# Patient Record
Sex: MALE | Race: WHITE | URBAN - METROPOLITAN AREA
[De-identification: names, ages, dates, MRNs, and addresses within clinical notes are randomized per-mention and may not be internally consistent; named-entity substitution may affect disease eponyms.]

---

## 2019-09-25 LAB
ANION GAP SERPL CALCULATED.3IONS-SCNC: 14 MMOL/L (ref 10–20)
BICARBONATE: 30 MMOL/L (ref 21–32)
CALCIUM SERPL-MCNC: 8.6 MG/DL (ref 8.6–10.3)
CHLORIDE BLD-SCNC: 97 MMOL/L (ref 98–107)
CREAT SERPL-MCNC: 1.62 MG/DL (ref 0.5–1.3)
ERYTHROCYTE [DISTWIDTH] IN BLOOD BY AUTOMATED COUNT: 12.5 % (ref 11.5–14)
GFR AFRICAN AMERICAN: 51 ML/MIN/1.73M2
GFR NON-AFRICAN AMERICAN: 42 ML/MIN/1.73M2
GLUCOSE: 247 MG/DL (ref 74–99)
HCT VFR BLD CALC: 35.8 % (ref 41–52)
HEMOGLOBIN: 11.7 G/DL (ref 13.5–17)
MCHC RBC AUTO-ENTMCNC: 32.7 G/DL (ref 32–36)
MCV RBC AUTO: 89 FL (ref 80–100)
PLATELET # BLD: 357 X10E9/L (ref 150–450)
POTASSIUM SERPL-SCNC: 3.7 MMOL/L (ref 3.5–5.3)
RBC # BLD: 4.03 X10E12/L (ref 4.5–5.9)
SODIUM BLD-SCNC: 137 MMOL/L (ref 136–145)
UREA NITROGEN: 25 MG/DL (ref 6–23)
WBC: 6.5 X10E9/L (ref 4.4–11.3)

## 2019-10-02 LAB
ANION GAP SERPL CALCULATED.3IONS-SCNC: 13 MMOL/L (ref 10–20)
BICARBONATE: 29 MMOL/L (ref 21–32)
CALCIUM SERPL-MCNC: 9.5 MG/DL (ref 8.6–10.3)
CHLORIDE BLD-SCNC: 101 MMOL/L (ref 98–107)
CREAT SERPL-MCNC: 1.41 MG/DL (ref 0.5–1.3)
ERYTHROCYTE [DISTWIDTH] IN BLOOD BY AUTOMATED COUNT: 12.7 % (ref 11.5–14)
GFR AFRICAN AMERICAN: 59 ML/MIN/1.73M2
GFR NON-AFRICAN AMERICAN: 49 ML/MIN/1.73M2
GLUCOSE: 66 MG/DL (ref 74–99)
HCT VFR BLD CALC: 35.6 % (ref 41–52)
HEMOGLOBIN: 11.6 G/DL (ref 13.5–17)
MCHC RBC AUTO-ENTMCNC: 32.6 G/DL (ref 32–36)
MCV RBC AUTO: 88 FL (ref 80–100)
PLATELET # BLD: 536 X10E9/L (ref 150–450)
POTASSIUM SERPL-SCNC: 3.8 MMOL/L (ref 3.5–5.3)
RBC # BLD: 4.05 X10E12/L (ref 4.5–5.9)
SODIUM BLD-SCNC: 139 MMOL/L (ref 136–145)
UREA NITROGEN: 27 MG/DL (ref 6–23)
WBC: 9 X10E9/L (ref 4.4–11.3)

## 2019-10-03 ENCOUNTER — TELEPHONE (OUTPATIENT)
Dept: INFECTIOUS DISEASES | Age: 74
End: 2019-10-03

## 2019-10-07 ENCOUNTER — TELEPHONE (OUTPATIENT)
Dept: INFECTIOUS DISEASES | Age: 74
End: 2019-10-07

## 2019-10-08 ENCOUNTER — OFFICE VISIT (OUTPATIENT)
Dept: INFECTIOUS DISEASES | Age: 74
End: 2019-10-08
Payer: MEDICARE

## 2019-10-08 VITALS
BODY MASS INDEX: 34.4 KG/M2 | HEART RATE: 62 BPM | TEMPERATURE: 97.8 F | HEIGHT: 72 IN | WEIGHT: 254 LBS | DIASTOLIC BLOOD PRESSURE: 64 MMHG | RESPIRATION RATE: 17 BRPM | SYSTOLIC BLOOD PRESSURE: 160 MMHG

## 2019-10-08 DIAGNOSIS — L03.115 CELLULITIS OF RIGHT LOWER EXTREMITY: Primary | ICD-10-CM

## 2019-10-08 PROCEDURE — 99213 OFFICE O/P EST LOW 20 MIN: CPT | Performed by: INTERNAL MEDICINE

## 2019-10-08 RX ORDER — AMOXICILLIN 250 MG/1
500 CAPSULE ORAL 3 TIMES DAILY
Qty: 60 CAPSULE | Refills: 1 | Status: SHIPPED | OUTPATIENT
Start: 2019-10-08 | End: 2019-10-18

## 2019-10-08 ASSESSMENT — ENCOUNTER SYMPTOMS
RESPIRATORY NEGATIVE: 1
GASTROINTESTINAL NEGATIVE: 1

## 2019-11-22 ENCOUNTER — TELEPHONE (OUTPATIENT)
Dept: INFECTIOUS DISEASES | Age: 74
End: 2019-11-22

## 2019-11-22 RX ORDER — AMOXICILLIN 500 MG/1
500 CAPSULE ORAL 4 TIMES DAILY
Qty: 40 CAPSULE | Refills: 0 | Status: SHIPPED | OUTPATIENT
Start: 2019-11-22 | End: 2019-12-02

## 2019-11-25 ENCOUNTER — TELEPHONE (OUTPATIENT)
Dept: INFECTIOUS DISEASES | Age: 74
End: 2019-11-25

## 2020-04-09 ENCOUNTER — VIRTUAL VISIT (OUTPATIENT)
Dept: INFECTIOUS DISEASES | Age: 75
End: 2020-04-09
Payer: MEDICARE

## 2020-04-09 PROCEDURE — 99214 OFFICE O/P EST MOD 30 MIN: CPT | Performed by: INTERNAL MEDICINE

## 2020-04-09 RX ORDER — AMOXICILLIN 500 MG/1
500 CAPSULE ORAL 2 TIMES DAILY
Qty: 20 CAPSULE | Refills: 0 | Status: SHIPPED | OUTPATIENT
Start: 2020-04-09 | End: 2021-08-31 | Stop reason: SDUPTHER

## 2020-04-09 NOTE — PROGRESS NOTES
infection were also discussed as well as what it means to self-quarantine in the event of mild symptoms.      Time spent with patient: 25 min  >50% of time spent counseling on patient care and coordination of care      Caitlyn Baker DO

## 2021-08-30 NOTE — TELEPHONE ENCOUNTER
Patient states he is experiencing inflamation to the RLE, as before, concerned the Cellulitis has returned. RLE Red and Warm. Pain is about a 3-4(on scale from 1-10) Denies Fever/chills, no blisters or sores. Patient confirms local Phx is [facility] in [place], [place]. Patient last seen 4-9-2020 by Dr Carina Riley. Amoxil 500 mg, po, bid, x14 days was ordered at that time. Pt confirms NKA.    Will forward request.

## 2021-08-31 RX ORDER — AMOXICILLIN 500 MG/1
500 CAPSULE ORAL 2 TIMES DAILY
Qty: 20 CAPSULE | Refills: 0 | Status: SHIPPED | OUTPATIENT
Start: 2021-08-31 | End: 2021-09-14

## 2021-09-01 NOTE — TELEPHONE ENCOUNTER
Return call to patient, to advise of Rx having been approved and sent to local pharmacy. ID physician states to have him F/u in clinic. Patient was not home at this time, his wife took the message and will have him return a call to ID. Also, [facility] called to Confirm the order. It states to take for 14 days, bid, but only a quantity of 20. Advised to provide a Quantity of 28.

## 2023-01-01 ENCOUNTER — NURSING HOME VISIT (OUTPATIENT)
Dept: POST ACUTE CARE | Facility: EXTERNAL LOCATION | Age: 78
End: 2023-01-01
Payer: MEDICARE

## 2023-01-01 ENCOUNTER — HOME VISIT (OUTPATIENT)
Dept: PRIMARY CARE | Facility: CLINIC | Age: 78
End: 2023-01-01
Payer: MEDICARE

## 2023-01-01 ENCOUNTER — APPOINTMENT (OUTPATIENT)
Dept: PRIMARY CARE | Facility: CLINIC | Age: 78
End: 2023-01-01
Payer: MEDICARE

## 2023-01-01 ENCOUNTER — PATIENT OUTREACH (OUTPATIENT)
Dept: PHARMACY | Facility: HOSPITAL | Age: 78
End: 2023-01-01
Payer: MEDICARE

## 2023-01-01 ENCOUNTER — PATIENT OUTREACH (OUTPATIENT)
Dept: CARE COORDINATION | Facility: CLINIC | Age: 78
End: 2023-01-01
Payer: MEDICARE

## 2023-01-01 ENCOUNTER — APPOINTMENT (OUTPATIENT)
Dept: VASCULAR SURGERY | Facility: CLINIC | Age: 78
End: 2023-01-01
Payer: MEDICARE

## 2023-01-01 ENCOUNTER — TELEPHONE (OUTPATIENT)
Dept: PRIMARY CARE | Facility: CLINIC | Age: 78
End: 2023-01-01
Payer: MEDICARE

## 2023-01-01 ENCOUNTER — OFFICE VISIT (OUTPATIENT)
Dept: PRIMARY CARE | Facility: CLINIC | Age: 78
End: 2023-01-01
Payer: MEDICARE

## 2023-01-01 VITALS
HEART RATE: 56 BPM | BODY MASS INDEX: 35.16 KG/M2 | TEMPERATURE: 96.9 F | SYSTOLIC BLOOD PRESSURE: 140 MMHG | DIASTOLIC BLOOD PRESSURE: 79 MMHG | HEIGHT: 74 IN | WEIGHT: 274 LBS

## 2023-01-01 VITALS
DIASTOLIC BLOOD PRESSURE: 56 MMHG | SYSTOLIC BLOOD PRESSURE: 80 MMHG | WEIGHT: 213 LBS | HEIGHT: 68 IN | BODY MASS INDEX: 32.28 KG/M2 | HEART RATE: 78 BPM | TEMPERATURE: 96.9 F

## 2023-01-01 VITALS
DIASTOLIC BLOOD PRESSURE: 80 MMHG | TEMPERATURE: 98 F | BODY MASS INDEX: 32.71 KG/M2 | HEART RATE: 77 BPM | WEIGHT: 212 LBS | SYSTOLIC BLOOD PRESSURE: 151 MMHG

## 2023-01-01 DIAGNOSIS — I10 BENIGN ESSENTIAL HYPERTENSION: ICD-10-CM

## 2023-01-01 DIAGNOSIS — E11.22 TYPE 2 DIABETES MELLITUS WITH ESRD (END-STAGE RENAL DISEASE) (MULTI): ICD-10-CM

## 2023-01-01 DIAGNOSIS — N18.30 STAGE 3 CHRONIC KIDNEY DISEASE, UNSPECIFIED WHETHER STAGE 3A OR 3B CKD (MULTI): ICD-10-CM

## 2023-01-01 DIAGNOSIS — Z79.4 TYPE 2 DIABETES MELLITUS WITH STAGE 4 CHRONIC KIDNEY DISEASE, WITH LONG-TERM CURRENT USE OF INSULIN (MULTI): Primary | ICD-10-CM

## 2023-01-01 DIAGNOSIS — N18.4 TYPE 2 DIABETES MELLITUS WITH STAGE 4 CHRONIC KIDNEY DISEASE, WITH LONG-TERM CURRENT USE OF INSULIN (MULTI): Primary | ICD-10-CM

## 2023-01-01 DIAGNOSIS — I13.10 CARDIORENAL DISEASE: ICD-10-CM

## 2023-01-01 DIAGNOSIS — E66.01 OBESITY, MORBID (MULTI): ICD-10-CM

## 2023-01-01 DIAGNOSIS — I50.43 ACUTE ON CHRONIC COMBINED SYSTOLIC AND DIASTOLIC CONGESTIVE HEART FAILURE (MULTI): ICD-10-CM

## 2023-01-01 DIAGNOSIS — E11.9 DIABETES MELLITUS WITHOUT COMPLICATION (MULTI): ICD-10-CM

## 2023-01-01 DIAGNOSIS — N18.4 TYPE 2 DIABETES MELLITUS WITH STAGE 4 CHRONIC KIDNEY DISEASE, WITH LONG-TERM CURRENT USE OF INSULIN (MULTI): ICD-10-CM

## 2023-01-01 DIAGNOSIS — I48.0 PAROXYSMAL ATRIAL FIBRILLATION (MULTI): Primary | ICD-10-CM

## 2023-01-01 DIAGNOSIS — G47.00 INSOMNIA, UNSPECIFIED TYPE: ICD-10-CM

## 2023-01-01 DIAGNOSIS — I50.9 CHRONIC HEART FAILURE, UNSPECIFIED HEART FAILURE TYPE (MULTI): ICD-10-CM

## 2023-01-01 DIAGNOSIS — E78.00 HYPERCHOLESTEROLEMIA: ICD-10-CM

## 2023-01-01 DIAGNOSIS — Z79.4 TYPE 2 DIABETES MELLITUS WITH STAGE 4 CHRONIC KIDNEY DISEASE, WITH LONG-TERM CURRENT USE OF INSULIN (MULTI): ICD-10-CM

## 2023-01-01 DIAGNOSIS — R06.02 SOB (SHORTNESS OF BREATH) ON EXERTION: Primary | ICD-10-CM

## 2023-01-01 DIAGNOSIS — E11.22 TYPE 2 DIABETES MELLITUS WITH STAGE 4 CHRONIC KIDNEY DISEASE, WITH LONG-TERM CURRENT USE OF INSULIN (MULTI): Primary | ICD-10-CM

## 2023-01-01 DIAGNOSIS — M54.50 CHRONIC LOW BACK PAIN, UNSPECIFIED BACK PAIN LATERALITY, UNSPECIFIED WHETHER SCIATICA PRESENT: ICD-10-CM

## 2023-01-01 DIAGNOSIS — F51.01 PRIMARY INSOMNIA: Primary | ICD-10-CM

## 2023-01-01 DIAGNOSIS — N18.4 CHRONIC KIDNEY DISEASE, STAGE 4 (SEVERE) (MULTI): ICD-10-CM

## 2023-01-01 DIAGNOSIS — E11.22 TYPE 2 DIABETES MELLITUS WITH STAGE 4 CHRONIC KIDNEY DISEASE, WITH LONG-TERM CURRENT USE OF INSULIN (MULTI): ICD-10-CM

## 2023-01-01 DIAGNOSIS — E87.70 HYPERVOLEMIA, UNSPECIFIED HYPERVOLEMIA TYPE: ICD-10-CM

## 2023-01-01 DIAGNOSIS — R35.1 NOCTURIA: ICD-10-CM

## 2023-01-01 DIAGNOSIS — I48.11 LONGSTANDING PERSISTENT ATRIAL FIBRILLATION (MULTI): ICD-10-CM

## 2023-01-01 DIAGNOSIS — J81.0 ACUTE PULMONARY EDEMA (MULTI): ICD-10-CM

## 2023-01-01 DIAGNOSIS — G89.29 CHRONIC LOW BACK PAIN, UNSPECIFIED BACK PAIN LATERALITY, UNSPECIFIED WHETHER SCIATICA PRESENT: ICD-10-CM

## 2023-01-01 DIAGNOSIS — I48.0 PAROXYSMAL ATRIAL FIBRILLATION (MULTI): ICD-10-CM

## 2023-01-01 DIAGNOSIS — I50.33 ACUTE ON CHRONIC DIASTOLIC HEART FAILURE (MULTI): ICD-10-CM

## 2023-01-01 DIAGNOSIS — Z95.0 PRESENCE OF CARDIAC PACEMAKER: ICD-10-CM

## 2023-01-01 DIAGNOSIS — I10 BENIGN HYPERTENSION: ICD-10-CM

## 2023-01-01 DIAGNOSIS — E11.9 TYPE 2 DIABETES MELLITUS WITHOUT COMPLICATION, WITHOUT LONG-TERM CURRENT USE OF INSULIN (MULTI): ICD-10-CM

## 2023-01-01 DIAGNOSIS — I12.9 HYPERTENSIVE NEPHROPATHY: ICD-10-CM

## 2023-01-01 DIAGNOSIS — N18.6 TYPE 2 DIABETES MELLITUS WITH ESRD (END-STAGE RENAL DISEASE) (MULTI): ICD-10-CM

## 2023-01-01 DIAGNOSIS — I89.0 SECONDARY LYMPHEDEMA: Primary | ICD-10-CM

## 2023-01-01 DIAGNOSIS — N32.81 OAB (OVERACTIVE BLADDER): Primary | ICD-10-CM

## 2023-01-01 DIAGNOSIS — I49.5 SICK SINUS SYNDROME (MULTI): ICD-10-CM

## 2023-01-01 DIAGNOSIS — M54.50 ACUTE BILATERAL LOW BACK PAIN WITHOUT SCIATICA: Primary | ICD-10-CM

## 2023-01-01 LAB
ALANINE AMINOTRANSFERASE (SGPT) (U/L) IN SER/PLAS: 29 U/L (ref 10–52)
ALBUMIN (G/DL) IN SER/PLAS: 3.7 G/DL (ref 3.4–5)
ALKALINE PHOSPHATASE (U/L) IN SER/PLAS: 115 U/L (ref 33–136)
ANION GAP IN SER/PLAS: 13 MMOL/L (ref 10–20)
APPEARANCE, URINE: CLEAR
ASPARTATE AMINOTRANSFERASE (SGOT) (U/L) IN SER/PLAS: 24 U/L (ref 9–39)
BILIRUBIN TOTAL (MG/DL) IN SER/PLAS: 0.5 MG/DL (ref 0–1.2)
BILIRUBIN, URINE: NEGATIVE
BLOOD, URINE: ABNORMAL
CALCIUM (MG/DL) IN SER/PLAS: 9.2 MG/DL (ref 8.6–10.3)
CARBON DIOXIDE, TOTAL (MMOL/L) IN SER/PLAS: 24 MMOL/L (ref 21–32)
CHLORIDE (MMOL/L) IN SER/PLAS: 111 MMOL/L (ref 98–107)
COLOR, URINE: YELLOW
CREATININE (MG/DL) IN SER/PLAS: 2.46 MG/DL (ref 0.5–1.3)
CREATININE (MG/DL) IN URINE: 69.8 MG/DL (ref 20–370)
ERYTHROCYTE DISTRIBUTION WIDTH (RATIO) BY AUTOMATED COUNT: 14.2 % (ref 11.5–14.5)
ERYTHROCYTE MEAN CORPUSCULAR HEMOGLOBIN CONCENTRATION (G/DL) BY AUTOMATED: 31.3 G/DL (ref 32–36)
ERYTHROCYTE MEAN CORPUSCULAR VOLUME (FL) BY AUTOMATED COUNT: 89 FL (ref 80–100)
ERYTHROCYTES (10*6/UL) IN BLOOD BY AUTOMATED COUNT: 3.97 X10E12/L (ref 4.5–5.9)
ESTIMATED AVERAGE GLUCOSE FOR HBA1C: 157 MG/DL
GFR MALE: 26 ML/MIN/1.73M2
GLUCOSE (MG/DL) IN SER/PLAS: 102 MG/DL (ref 74–99)
GLUCOSE, URINE: 100 MG/DL
HEMATOCRIT (%) IN BLOOD BY AUTOMATED COUNT: 35.5 % (ref 41–52)
HEMOGLOBIN (G/DL) IN BLOOD: 11.1 G/DL (ref 13.5–17.5)
HEMOGLOBIN A1C/HEMOGLOBIN TOTAL IN BLOOD: 7.1 %
KETONES, URINE: NEGATIVE MG/DL
LEUKOCYTE ESTERASE, URINE: NEGATIVE
LEUKOCYTES (10*3/UL) IN BLOOD BY AUTOMATED COUNT: 5.6 X10E9/L (ref 4.4–11.3)
NITRITE, URINE: NEGATIVE
PH, URINE: 5.5 (ref 5–8)
PHOSPHATE (MG/DL) IN SER/PLAS: 4.7 MG/DL (ref 2.5–4.9)
PLATELETS (10*3/UL) IN BLOOD AUTOMATED COUNT: 295 X10E9/L (ref 150–450)
POTASSIUM (MMOL/L) IN SER/PLAS: 4.7 MMOL/L (ref 3.5–5.3)
PROTEIN (MG/DL) IN URINE: 605 MG/DL (ref 5–25)
PROTEIN TOTAL: 6.4 G/DL (ref 6.4–8.2)
PROTEIN, URINE: ABNORMAL MG/DL
PROTEIN/CREATININE (MG/MG) IN URINE: 8.67 MG/MG CREAT (ref 0–0.17)
RBC, URINE: 1 /HPF (ref 0–5)
SODIUM (MMOL/L) IN SER/PLAS: 143 MMOL/L (ref 136–145)
SPECIFIC GRAVITY, URINE: 1.02 (ref 1–1.03)
SQUAMOUS EPITHELIAL CELLS, URINE: <1 /HPF
UREA NITROGEN (MG/DL) IN SER/PLAS: 41 MG/DL (ref 6–23)
UROBILINOGEN, URINE: <2 MG/DL (ref 0–1.9)
WBC, URINE: 1 /HPF (ref 0–5)

## 2023-01-01 PROCEDURE — 1036F TOBACCO NON-USER: CPT | Performed by: INTERNAL MEDICINE

## 2023-01-01 PROCEDURE — 1160F RVW MEDS BY RX/DR IN RCRD: CPT | Performed by: INTERNAL MEDICINE

## 2023-01-01 PROCEDURE — 99308 SBSQ NF CARE LOW MDM 20: CPT | Performed by: STUDENT IN AN ORGANIZED HEALTH CARE EDUCATION/TRAINING PROGRAM

## 2023-01-01 PROCEDURE — 99306 1ST NF CARE HIGH MDM 50: CPT | Performed by: STUDENT IN AN ORGANIZED HEALTH CARE EDUCATION/TRAINING PROGRAM

## 2023-01-01 PROCEDURE — 1159F MED LIST DOCD IN RCRD: CPT | Performed by: INTERNAL MEDICINE

## 2023-01-01 PROCEDURE — 99309 SBSQ NF CARE MODERATE MDM 30: CPT | Performed by: STUDENT IN AN ORGANIZED HEALTH CARE EDUCATION/TRAINING PROGRAM

## 2023-01-01 PROCEDURE — 3078F DIAST BP <80 MM HG: CPT | Performed by: INTERNAL MEDICINE

## 2023-01-01 PROCEDURE — 3077F SYST BP >= 140 MM HG: CPT | Performed by: INTERNAL MEDICINE

## 2023-01-01 PROCEDURE — 99305 1ST NF CARE MODERATE MDM 35: CPT | Performed by: STUDENT IN AN ORGANIZED HEALTH CARE EDUCATION/TRAINING PROGRAM

## 2023-01-01 PROCEDURE — 1157F ADVNC CARE PLAN IN RCRD: CPT | Performed by: INTERNAL MEDICINE

## 2023-01-01 PROCEDURE — 99495 TRANSJ CARE MGMT MOD F2F 14D: CPT | Performed by: INTERNAL MEDICINE

## 2023-01-01 PROCEDURE — 3079F DIAST BP 80-89 MM HG: CPT | Performed by: INTERNAL MEDICINE

## 2023-01-01 PROCEDURE — 99213 OFFICE O/P EST LOW 20 MIN: CPT | Performed by: INTERNAL MEDICINE

## 2023-01-01 PROCEDURE — 3074F SYST BP LT 130 MM HG: CPT | Performed by: INTERNAL MEDICINE

## 2023-01-01 RX ORDER — HYDRALAZINE HYDROCHLORIDE 25 MG/1
TABLET, FILM COATED ORAL
COMMUNITY

## 2023-01-01 RX ORDER — LOSARTAN POTASSIUM 50 MG/1
50 TABLET ORAL DAILY
Qty: 90 TABLET | Refills: 3 | Status: SHIPPED | OUTPATIENT
Start: 2023-01-01 | End: 2023-10-13 | Stop reason: CLARIF

## 2023-01-01 RX ORDER — BENZONATATE 200 MG/1
2 CAPSULE ORAL 2 TIMES DAILY
COMMUNITY
Start: 2023-01-01

## 2023-01-01 RX ORDER — HYDRALAZINE HYDROCHLORIDE 100 MG/1
100 TABLET, FILM COATED ORAL EVERY 8 HOURS
COMMUNITY
Start: 2023-01-01

## 2023-01-01 RX ORDER — PEN NEEDLE, DIABETIC 31 GX3/16"
2 NEEDLE, DISPOSABLE MISCELLANEOUS 2 TIMES DAILY
Qty: 200 EACH | Refills: 3 | Status: SHIPPED
Start: 2023-01-01 | End: 2023-10-13 | Stop reason: CLARIF

## 2023-01-01 RX ORDER — AMLODIPINE BESYLATE 10 MG/1
10 TABLET ORAL DAILY
Qty: 90 TABLET | Refills: 0 | Status: SHIPPED | OUTPATIENT
Start: 2023-01-01 | End: 2023-01-01 | Stop reason: DRUGHIGH

## 2023-01-01 RX ORDER — OXYBUTYNIN CHLORIDE 5 MG/1
5 TABLET ORAL 3 TIMES DAILY
COMMUNITY
End: 2023-01-01 | Stop reason: SDUPTHER

## 2023-01-01 RX ORDER — METOPROLOL SUCCINATE 25 MG/1
1 TABLET, EXTENDED RELEASE ORAL DAILY
COMMUNITY
Start: 2023-01-01 | End: 2023-01-01 | Stop reason: ENTERED-IN-ERROR

## 2023-01-01 RX ORDER — ATORVASTATIN CALCIUM 20 MG/1
20 TABLET, FILM COATED ORAL NIGHTLY
Qty: 90 TABLET | Refills: 3 | Status: SHIPPED | OUTPATIENT
Start: 2023-01-01 | End: 2023-10-13 | Stop reason: CLARIF

## 2023-01-01 RX ORDER — VALSARTAN AND HYDROCHLOROTHIAZIDE 320; 25 MG/1; MG/1
TABLET, FILM COATED ORAL
COMMUNITY

## 2023-01-01 RX ORDER — ERGOCALCIFEROL 1.25 MG/1
CAPSULE ORAL
COMMUNITY
End: 2023-01-01 | Stop reason: CLARIF

## 2023-01-01 RX ORDER — GABAPENTIN 300 MG/1
300 CAPSULE ORAL 2 TIMES DAILY
Qty: 180 CAPSULE | Refills: 0 | Status: SHIPPED | OUTPATIENT
Start: 2023-01-01 | End: 2023-01-01 | Stop reason: SDUPTHER

## 2023-01-01 RX ORDER — TRAMADOL HYDROCHLORIDE 50 MG/1
1 TABLET ORAL EVERY 6 HOURS
COMMUNITY
Start: 2023-01-01

## 2023-01-01 RX ORDER — IBUPROFEN 200 MG
1 CAPSULE ORAL 2 TIMES DAILY
Qty: 100 STRIP | Refills: 3 | Status: SHIPPED | OUTPATIENT
Start: 2023-01-01 | End: 2023-10-13 | Stop reason: CLARIF

## 2023-01-01 RX ORDER — BISACODYL 10 MG/1
1 SUPPOSITORY RECTAL DAILY PRN
COMMUNITY

## 2023-01-01 RX ORDER — GUAIFENESIN 400 MG/1
1 TABLET ORAL 4 TIMES DAILY
COMMUNITY
Start: 2023-01-01

## 2023-01-01 RX ORDER — METOPROLOL TARTRATE 25 MG/1
TABLET, FILM COATED ORAL
COMMUNITY
Start: 2022-08-30 | End: 2023-01-01 | Stop reason: DRUGHIGH

## 2023-01-01 RX ORDER — CLONIDINE 0.1 MG/24H
0.1 PATCH, EXTENDED RELEASE TRANSDERMAL DAILY
COMMUNITY

## 2023-01-01 RX ORDER — AMLODIPINE BESYLATE 10 MG/1
0.5 TABLET ORAL DAILY
COMMUNITY
End: 2023-01-01 | Stop reason: ALTCHOICE

## 2023-01-01 RX ORDER — ASPIRIN 81 MG/1
1 TABLET ORAL DAILY
COMMUNITY
Start: 2017-10-12

## 2023-01-01 RX ORDER — LANCETS
EACH MISCELLANEOUS
Qty: 100 EACH | Refills: 3 | Status: SHIPPED | OUTPATIENT
Start: 2023-01-01 | End: 2023-01-01 | Stop reason: SDUPTHER

## 2023-01-01 RX ORDER — DILTIAZEM HYDROCHLORIDE 240 MG/1
CAPSULE, EXTENDED RELEASE ORAL
COMMUNITY
Start: 2022-07-19 | End: 2023-01-01 | Stop reason: ALTCHOICE

## 2023-01-01 RX ORDER — ACETAMINOPHEN 325 MG/1
2 TABLET ORAL EVERY 4 HOURS PRN
COMMUNITY

## 2023-01-01 RX ORDER — AMLODIPINE BESYLATE 10 MG/1
10 TABLET ORAL DAILY
Qty: 30 TABLET | Refills: 0 | Status: SHIPPED | OUTPATIENT
Start: 2023-01-01 | End: 2023-01-01 | Stop reason: SDUPTHER

## 2023-01-01 RX ORDER — ASPIRIN 325 MG
1 TABLET, DELAYED RELEASE (ENTERIC COATED) ORAL
COMMUNITY

## 2023-01-01 RX ORDER — FERROUS GLUCONATE 325 MG
38 TABLET ORAL 2 TIMES DAILY
COMMUNITY

## 2023-01-01 RX ORDER — CYCLOBENZAPRINE HCL 10 MG
10 TABLET ORAL 3 TIMES DAILY PRN
COMMUNITY
End: 2023-01-01 | Stop reason: ALTCHOICE

## 2023-01-01 RX ORDER — INSULIN LISPRO 100 [IU]/ML
INJECTION, SOLUTION INTRAVENOUS; SUBCUTANEOUS
COMMUNITY

## 2023-01-01 RX ORDER — SPIRONOLACTONE AND HYDROCHLOROTHIAZIDE 25; 25 MG/1; MG/1
1 TABLET ORAL DAILY
COMMUNITY

## 2023-01-01 RX ORDER — ALUMINUM HYDROXIDE, MAGNESIUM HYDROXIDE, AND SIMETHICONE 1200; 120; 1200 MG/30ML; MG/30ML; MG/30ML
15 SUSPENSION ORAL EVERY 6 HOURS PRN
COMMUNITY

## 2023-01-01 RX ORDER — GABAPENTIN 300 MG/1
300 CAPSULE ORAL 2 TIMES DAILY
COMMUNITY
Start: 2022-01-01 | End: 2023-01-01 | Stop reason: SDUPTHER

## 2023-01-01 RX ORDER — TORSEMIDE 20 MG/1
20 TABLET ORAL
COMMUNITY
Start: 2023-01-01

## 2023-01-01 RX ORDER — AMIODARONE HYDROCHLORIDE 200 MG/1
200 TABLET ORAL DAILY
COMMUNITY
Start: 2023-01-01

## 2023-01-01 RX ORDER — CYCLOBENZAPRINE HCL 10 MG
10 TABLET ORAL NIGHTLY PRN
Qty: 15 TABLET | Refills: 0 | Status: SHIPPED
Start: 2023-01-01 | End: 2023-01-01 | Stop reason: DRUGHIGH

## 2023-01-01 RX ORDER — AMOXICILLIN 250 MG
2 CAPSULE ORAL DAILY PRN
COMMUNITY

## 2023-01-01 RX ORDER — AMLODIPINE BESYLATE 10 MG/1
10 TABLET ORAL 2 TIMES DAILY
COMMUNITY
Start: 2017-10-12 | End: 2023-01-01 | Stop reason: SDUPTHER

## 2023-01-01 RX ORDER — OXYBUTYNIN CHLORIDE 5 MG/1
1 TABLET ORAL 3 TIMES DAILY
COMMUNITY

## 2023-01-01 RX ORDER — OXYBUTYNIN CHLORIDE 5 MG/1
5 TABLET ORAL 3 TIMES DAILY
Qty: 90 TABLET | Refills: 3 | Status: SHIPPED | OUTPATIENT
Start: 2023-01-01 | End: 2023-01-01 | Stop reason: SDUPTHER

## 2023-01-01 RX ORDER — IBUPROFEN 200 MG
1 CAPSULE ORAL 2 TIMES DAILY
Qty: 100 STRIP | Refills: 3 | Status: SHIPPED | OUTPATIENT
Start: 2023-01-01 | End: 2023-01-01 | Stop reason: SDUPTHER

## 2023-01-01 RX ORDER — GABAPENTIN 300 MG/1
300 CAPSULE ORAL 2 TIMES DAILY
Qty: 180 CAPSULE | Refills: 0 | Status: SHIPPED | OUTPATIENT
Start: 2023-01-01 | End: 2023-10-13 | Stop reason: CLARIF

## 2023-01-01 RX ORDER — PEN NEEDLE, DIABETIC 31 GX3/16"
2 NEEDLE, DISPOSABLE MISCELLANEOUS 2 TIMES DAILY
COMMUNITY
Start: 2023-01-01 | End: 2023-01-01 | Stop reason: SDUPTHER

## 2023-01-01 RX ORDER — METFORMIN HYDROCHLORIDE 500 MG/1
TABLET ORAL
COMMUNITY
End: 2023-01-01 | Stop reason: ENTERED-IN-ERROR

## 2023-01-01 RX ORDER — AMLODIPINE BESYLATE 2.5 MG/1
TABLET ORAL
COMMUNITY

## 2023-01-01 RX ORDER — DEXTROSE 4 G
TABLET,CHEWABLE ORAL
Qty: 1 EACH | Refills: 0 | Status: SHIPPED | OUTPATIENT
Start: 2023-01-01 | End: 2023-01-01 | Stop reason: SDUPTHER

## 2023-01-01 RX ORDER — INSULIN LISPRO 100 [IU]/ML
INJECTION, SOLUTION INTRAVENOUS; SUBCUTANEOUS
Qty: 10 ML | Refills: 2 | Status: SHIPPED
Start: 2023-01-01 | End: 2023-01-01 | Stop reason: SDUPTHER

## 2023-01-01 RX ORDER — INSULIN GLARGINE 100 [IU]/ML
40 INJECTION, SOLUTION SUBCUTANEOUS DAILY
COMMUNITY
Start: 2023-01-01

## 2023-01-01 RX ORDER — METFORMIN HYDROCHLORIDE EXTENDED-RELEASE TABLETS 500 MG/1
TABLET, FILM COATED, EXTENDED RELEASE ORAL
COMMUNITY

## 2023-01-01 RX ORDER — OXYBUTYNIN CHLORIDE 5 MG/1
5 TABLET, EXTENDED RELEASE ORAL DAILY
Qty: 30 TABLET | Refills: 6 | Status: SHIPPED | OUTPATIENT
Start: 2023-01-01 | End: 2023-10-13 | Stop reason: CLARIF

## 2023-01-01 RX ORDER — TRAZODONE HYDROCHLORIDE 100 MG/1
TABLET ORAL
Qty: 30 TABLET | Refills: 5 | Status: SHIPPED
Start: 2023-01-01 | End: 2023-10-13 | Stop reason: CLARIF

## 2023-01-01 RX ORDER — ADHESIVE BANDAGE
30 BANDAGE TOPICAL NIGHTLY PRN
COMMUNITY

## 2023-01-01 RX ORDER — LOSARTAN POTASSIUM 50 MG/1
50 TABLET ORAL DAILY
COMMUNITY
Start: 2023-01-01 | End: 2023-01-01 | Stop reason: SDUPTHER

## 2023-01-01 RX ORDER — TORSEMIDE 100 MG/1
TABLET ORAL
COMMUNITY
Start: 2022-07-19 | End: 2023-01-01 | Stop reason: ALTCHOICE

## 2023-01-01 RX ORDER — CLONIDINE 0.1 MG/24H
PATCH, EXTENDED RELEASE TRANSDERMAL
COMMUNITY
Start: 2023-01-01 | End: 2023-01-01 | Stop reason: ALTCHOICE

## 2023-01-01 RX ORDER — GUAIFENESIN 600 MG/1
1 TABLET, EXTENDED RELEASE ORAL EVERY 12 HOURS
COMMUNITY

## 2023-01-01 RX ORDER — INSULIN LISPRO 100 [IU]/ML
INJECTION, SOLUTION INTRAVENOUS; SUBCUTANEOUS
COMMUNITY
End: 2023-01-01 | Stop reason: SDUPTHER

## 2023-01-01 RX ORDER — OXYBUTYNIN CHLORIDE 5 MG/1
5 TABLET ORAL 3 TIMES DAILY
Qty: 90 TABLET | Refills: 3 | Status: SHIPPED | OUTPATIENT
Start: 2023-01-01 | End: 2023-01-01 | Stop reason: SINTOL

## 2023-01-01 RX ORDER — INSULIN DETEMIR 100 [IU]/ML
INJECTION, SOLUTION SUBCUTANEOUS
COMMUNITY

## 2023-01-01 RX ORDER — POLYETHYLENE GLYCOL 3350 17 G/17G
17 POWDER, FOR SOLUTION ORAL DAILY PRN
COMMUNITY

## 2023-01-01 RX ORDER — BENZONATATE 200 MG/1
1 CAPSULE ORAL 2 TIMES DAILY
COMMUNITY
Start: 2023-01-01 | End: 2023-01-01 | Stop reason: ENTERED-IN-ERROR

## 2023-01-01 RX ORDER — CLONIDINE HYDROCHLORIDE 0.1 MG/1
1 TABLET ORAL 2 TIMES DAILY
COMMUNITY
Start: 2023-01-01

## 2023-01-01 RX ORDER — LANCETS
EACH MISCELLANEOUS
Qty: 100 EACH | Refills: 3 | Status: SHIPPED | OUTPATIENT
Start: 2023-01-01 | End: 2023-10-13 | Stop reason: CLARIF

## 2023-01-01 RX ORDER — TRAZODONE HYDROCHLORIDE 100 MG/1
TABLET ORAL
COMMUNITY
Start: 2017-10-12 | End: 2023-01-01

## 2023-01-01 RX ORDER — INSULIN GLARGINE 300 U/ML
40 INJECTION, SOLUTION SUBCUTANEOUS DAILY
Qty: 4.5 ML | Refills: 9 | Status: SHIPPED
Start: 2023-01-01 | End: 2023-01-01 | Stop reason: DRUGHIGH

## 2023-01-01 RX ORDER — HYDROCODONE/ACETAMINOPHEN 5 MG-500MG
6 TABLET ORAL
COMMUNITY

## 2023-01-01 RX ORDER — INSULIN LISPRO 100 [IU]/ML
INJECTION, SOLUTION INTRAVENOUS; SUBCUTANEOUS
COMMUNITY
Start: 2023-01-01

## 2023-01-01 RX ORDER — CLONIDINE HYDROCHLORIDE 0.1 MG/1
1 TABLET ORAL DAILY
COMMUNITY
End: 2023-01-01 | Stop reason: ALTCHOICE

## 2023-01-01 RX ORDER — AMLODIPINE BESYLATE 5 MG/1
5 TABLET ORAL DAILY
Qty: 90 TABLET | Refills: 1 | Status: SHIPPED | OUTPATIENT
Start: 2023-01-01 | End: 2023-10-13 | Stop reason: CLARIF

## 2023-01-01 RX ORDER — INSULIN GLARGINE 300 U/ML
50 INJECTION, SOLUTION SUBCUTANEOUS DAILY
Qty: 4.5 ML | Refills: 9 | Status: SHIPPED
Start: 2023-01-01 | End: 2023-10-13 | Stop reason: CLARIF

## 2023-01-01 RX ORDER — INSULIN GLARGINE 300 U/ML
40 INJECTION, SOLUTION SUBCUTANEOUS NIGHTLY
COMMUNITY

## 2023-01-01 RX ORDER — DEXTROSE 4 G
TABLET,CHEWABLE ORAL
Qty: 1 EACH | Refills: 0 | Status: SHIPPED | OUTPATIENT
Start: 2023-01-01 | End: 2023-10-13 | Stop reason: CLARIF

## 2023-01-01 RX ORDER — TAMSULOSIN HYDROCHLORIDE 0.4 MG/1
0.4 CAPSULE ORAL NIGHTLY
COMMUNITY

## 2023-01-01 RX ORDER — LOSARTAN POTASSIUM 25 MG/1
1 TABLET ORAL 2 TIMES DAILY
COMMUNITY

## 2023-01-01 RX ORDER — INSULIN LISPRO 100 [IU]/ML
INJECTION, SOLUTION INTRAVENOUS; SUBCUTANEOUS
Qty: 15 ML | Refills: 3 | Status: SHIPPED | OUTPATIENT
Start: 2023-01-01 | End: 2023-10-13 | Stop reason: CLARIF

## 2023-01-01 RX ORDER — INSULIN GLARGINE 300 U/ML
40 INJECTION, SOLUTION SUBCUTANEOUS DAILY
Qty: 4.5 ML | Refills: 9 | Status: SHIPPED | OUTPATIENT
Start: 2023-01-01 | End: 2023-01-01 | Stop reason: SDUPTHER

## 2023-01-01 RX ORDER — CHOLECALCIFEROL (VITAMIN D3) 1250 MCG
50000 TABLET ORAL
COMMUNITY

## 2023-01-01 RX ORDER — ATORVASTATIN CALCIUM 20 MG/1
1 TABLET, FILM COATED ORAL NIGHTLY
COMMUNITY
Start: 2017-10-12 | End: 2023-01-01 | Stop reason: SDUPTHER

## 2023-01-01 RX ORDER — METOPROLOL SUCCINATE 25 MG/1
25 TABLET, EXTENDED RELEASE ORAL DAILY
Qty: 90 TABLET | Refills: 1 | Status: SHIPPED | OUTPATIENT
Start: 2023-01-01 | End: 2023-10-13 | Stop reason: CLARIF

## 2023-01-01 RX ORDER — SPIRONOLACTONE AND HYDROCHLOROTHIAZIDE 25; 25 MG/1; MG/1
1 TABLET ORAL DAILY
COMMUNITY
Start: 2023-01-01

## 2023-01-01 RX ORDER — INSULIN GLARGINE 300 U/ML
40 INJECTION, SOLUTION SUBCUTANEOUS DAILY
COMMUNITY
Start: 2023-01-01 | End: 2023-01-01 | Stop reason: SDUPTHER

## 2023-01-01 RX ORDER — FUROSEMIDE 40 MG/1
1 TABLET ORAL DAILY
COMMUNITY
Start: 2022-08-09

## 2023-01-01 RX ORDER — IPRATROPIUM BROMIDE AND ALBUTEROL SULFATE 2.5; .5 MG/3ML; MG/3ML
3 SOLUTION RESPIRATORY (INHALATION) 4 TIMES DAILY PRN
COMMUNITY

## 2023-01-01 RX ORDER — LIDOCAINE 50 MG/G
1 PATCH TOPICAL DAILY PRN
COMMUNITY

## 2023-01-01 ASSESSMENT — ENCOUNTER SYMPTOMS
RESPIRATORY NEGATIVE: 1
RESPIRATORY NEGATIVE: 1
PSYCHIATRIC NEGATIVE: 1
GASTROINTESTINAL NEGATIVE: 1
SHORTNESS OF BREATH: 0
CONSTITUTIONAL NEGATIVE: 1
CARDIOVASCULAR NEGATIVE: 1
PSYCHIATRIC NEGATIVE: 1
BLURRED VISION: 0
GASTROINTESTINAL NEGATIVE: 1
HYPERTENSION: 1
CONSTITUTIONAL NEGATIVE: 1
RESPIRATORY NEGATIVE: 1
GASTROINTESTINAL NEGATIVE: 1
NEUROLOGICAL NEGATIVE: 1
SHORTNESS OF BREATH: 1
OCCASIONAL FEELINGS OF UNSTEADINESS: 0
MUSCULOSKELETAL NEGATIVE: 1
NEUROLOGICAL NEGATIVE: 1
RESPIRATORY NEGATIVE: 1
NEUROLOGICAL NEGATIVE: 1
RESPIRATORY NEGATIVE: 1
RESPIRATORY NEGATIVE: 1
CARDIOVASCULAR NEGATIVE: 1
CONFUSION: 0
GASTROINTESTINAL NEGATIVE: 1
CONSTITUTIONAL NEGATIVE: 1
NEUROLOGICAL NEGATIVE: 1
HYPERTENSION: 1
CONSTITUTIONAL NEGATIVE: 1
NEUROLOGICAL NEGATIVE: 1
CONSTITUTIONAL NEGATIVE: 1
WEAKNESS: 0
GASTROINTESTINAL NEGATIVE: 1
DEPRESSION: 0
CHEST TIGHTNESS: 1
GASTROINTESTINAL NEGATIVE: 1
NEUROLOGICAL NEGATIVE: 1
GASTROINTESTINAL NEGATIVE: 1
EYES NEGATIVE: 1
MUSCULOSKELETAL NEGATIVE: 1
MUSCULOSKELETAL NEGATIVE: 1
HEADACHES: 0
PSYCHIATRIC NEGATIVE: 1
PSYCHIATRIC NEGATIVE: 1
NEUROLOGICAL NEGATIVE: 1
CARDIOVASCULAR NEGATIVE: 1
CONSTITUTIONAL NEGATIVE: 1
MUSCULOSKELETAL NEGATIVE: 1
CARDIOVASCULAR NEGATIVE: 1
GASTROINTESTINAL NEGATIVE: 1
GASTROINTESTINAL NEGATIVE: 1
PSYCHIATRIC NEGATIVE: 1
FATIGUE: 1
LOSS OF SENSATION IN FEET: 0
PSYCHIATRIC NEGATIVE: 1
CONSTITUTIONAL NEGATIVE: 1
CONSTITUTIONAL NEGATIVE: 1
WHEEZING: 1
NEUROLOGICAL NEGATIVE: 1
MUSCULOSKELETAL NEGATIVE: 1
GASTROINTESTINAL NEGATIVE: 1
CARDIOVASCULAR NEGATIVE: 1
DIZZINESS: 0
CARDIOVASCULAR NEGATIVE: 1
CARDIOVASCULAR NEGATIVE: 1
MUSCULOSKELETAL NEGATIVE: 1
RESPIRATORY NEGATIVE: 1
MUSCULOSKELETAL NEGATIVE: 1
SHORTNESS OF BREATH: 1
ARTHRALGIAS: 1
MUSCULOSKELETAL NEGATIVE: 1
PSYCHIATRIC NEGATIVE: 1
EYES NEGATIVE: 1
NEUROLOGICAL NEGATIVE: 1
PSYCHIATRIC NEGATIVE: 1
MUSCULOSKELETAL NEGATIVE: 1
CARDIOVASCULAR NEGATIVE: 1
NEUROLOGICAL NEGATIVE: 1
CONSTITUTIONAL NEGATIVE: 1

## 2023-04-24 PROBLEM — Z79.4 TYPE 2 DIABETES MELLITUS WITH STAGE 4 CHRONIC KIDNEY DISEASE, WITH LONG-TERM CURRENT USE OF INSULIN (MULTI): Status: ACTIVE | Noted: 2023-01-01

## 2023-04-24 PROBLEM — J96.01 ACUTE RESPIRATORY FAILURE WITH HYPOXIA (MULTI): Status: ACTIVE | Noted: 2023-01-01

## 2023-04-24 PROBLEM — Z43.3 ENCOUNTER FOR ATTENTION TO COLOSTOMY (MULTI): Status: ACTIVE | Noted: 2023-01-01

## 2023-04-24 PROBLEM — I48.20 CHRONIC ATRIAL FIBRILLATION, UNSPECIFIED (MULTI): Status: ACTIVE | Noted: 2023-01-01

## 2023-04-24 PROBLEM — E78.00 HYPERCHOLESTEROLEMIA: Status: ACTIVE | Noted: 2023-01-01

## 2023-04-24 PROBLEM — N18.4 TYPE 2 DIABETES MELLITUS WITH STAGE 4 CHRONIC KIDNEY DISEASE, WITH LONG-TERM CURRENT USE OF INSULIN (MULTI): Status: ACTIVE | Noted: 2023-01-01

## 2023-04-24 PROBLEM — I10 BENIGN ESSENTIAL HYPERTENSION: Status: ACTIVE | Noted: 2023-01-01

## 2023-04-24 PROBLEM — E11.22 TYPE 2 DIABETES MELLITUS WITH STAGE 4 CHRONIC KIDNEY DISEASE, WITH LONG-TERM CURRENT USE OF INSULIN (MULTI): Status: ACTIVE | Noted: 2023-01-01

## 2023-04-24 PROBLEM — N18.4 CHRONIC KIDNEY DISEASE, STAGE 4 (SEVERE) (MULTI): Status: ACTIVE | Noted: 2023-01-01

## 2023-04-24 PROBLEM — E66.01 OBESITY, MORBID (MULTI): Status: ACTIVE | Noted: 2023-01-01

## 2023-04-24 PROBLEM — I48.0 PAROXYSMAL ATRIAL FIBRILLATION (MULTI): Status: ACTIVE | Noted: 2023-01-01

## 2023-04-24 PROBLEM — I50.9 CHRONIC HEART FAILURE, UNSPECIFIED HEART FAILURE TYPE (MULTI): Status: ACTIVE | Noted: 2023-01-01

## 2023-04-24 PROBLEM — G47.00 INSOMNIA: Status: ACTIVE | Noted: 2023-01-01

## 2023-04-24 NOTE — PROGRESS NOTES
"Subjective   Patient ID: Eliecer Collins is a 78 y.o. male who presents for Follow-up (3 mo fu).    Diabetes  He presents for his follow-up diabetic visit. He has type 2 diabetes mellitus. His disease course has been stable. Pertinent negatives for hypoglycemia include no dizziness, headaches or pallor. Pertinent negatives for diabetes include no chest pain and no weakness. Symptoms are stable. He is compliant with treatment all of the time. He is following a diabetic diet. There is no change in his home blood glucose trend. An ACE inhibitor/angiotensin II receptor blocker is being taken. He does not see a podiatrist.Eye exam is current.   Hypertension  This is a chronic problem. The current episode started more than 1 year ago. The problem has been resolved since onset. Pertinent negatives include no chest pain or headaches. Risk factors for coronary artery disease include dyslipidemia, diabetes mellitus, male gender and obesity. Identifiable causes of hypertension include chronic renal disease.        Review of Systems   Constitutional: Negative.    HENT: Negative.     Eyes: Negative.    Respiratory: Negative.     Cardiovascular:  Positive for leg swelling. Negative for chest pain.   Gastrointestinal: Negative.    Musculoskeletal: Negative.    Skin: Negative.  Negative for pallor.   Neurological: Negative.  Negative for dizziness, weakness and headaches.       Objective   /79 (BP Location: Left arm, Patient Position: Sitting, BP Cuff Size: Adult)   Pulse 56   Temp 36.1 °C (96.9 °F) (Temporal)   Ht 1.88 m (6' 2\")   Wt 124 kg (274 lb)   BMI 35.18 kg/m²     Physical Exam  Vitals reviewed.   Constitutional:       Appearance: Normal appearance. He is normal weight.   HENT:      Head: Normocephalic and atraumatic.   Eyes:      Conjunctiva/sclera: Conjunctivae normal.   Cardiovascular:      Rate and Rhythm: Normal rate and regular rhythm.      Pulses: Normal pulses.   Pulmonary:      Effort: Pulmonary effort " is normal.      Breath sounds: Normal breath sounds.   Abdominal:      Palpations: Abdomen is soft.   Musculoskeletal:      Cervical back: Normal range of motion.      Right lower leg: Edema present.      Left lower leg: Edema present.   Skin:     General: Skin is warm and dry.   Neurological:      General: No focal deficit present.   Psychiatric:         Mood and Affect: Mood normal.       Assessment/Plan   Problem List Items Addressed This Visit          Circulatory    Benign essential hypertension    Paroxysmal atrial fibrillation (CMS/HCC)    Relevant Medications    dilTIAZem XR (Dilacor XR) 240 mg 24 hr capsule    metoprolol tartrate (Lopressor) 25 mg tablet       Genitourinary    Chronic kidney disease, stage 4 (severe) (CMS/[facility])       Endocrine/Metabolic    Type 2 diabetes mellitus with stage 4 chronic kidney disease, with long-term current use of insulin (CMS/[facility]) - Primary    Obesity, morbid (CMS/[facility])   Diabetes is chronic disease, it does not get cured but it can be controlled, in modern medicine there are variety of measures taken to control DM. Usually we want to preserve beta cell functions. Please understand the disease and how our life style can affect control of glycemia. Diabetes leads to macro and microvascular complications and they could be devastating. It is important to have annual eye check and frequent foot inspection and foot inspection. Kidney dysfunction including dialysis, foot amputations, neuropathy, foot ulcers and accelerated atherosclerotic vascular disease are known complications of uncontrolled DM, pt was educated and explained.   He continues to have leg swelling, this leg swelling is because of amlodipine, hydralazine, gabapentin.  We cannot take of any of these medications unfortunately, patient's leg swelling is quite intractable, he has been back to work, his hemoglobin A1c was 7.1, his creatinine was 2.4, his ejection fraction was improved, this is not congestive heart  failure, he does not have that much of nephrotic syndrome, nephrology follow-up was reviewed, unfortunately leg swelling is not going to go away because of pharmacotherapy he has been and I cannot take off vasodilators because of high blood pressure readings he gets when we go off hydralazine and also gabapentin has been given for neuropathy, he is not hypoglycemic, his colostomy has been closed, he goes for annual ophthalmic checkup, he will try his best to keep leg swelling away with the elastic stockings, diuretics to be continued, insomnia persist, trazodone therapy to be continued, periodic lab assessment and follow-up will be done, follow-up in 3 months.

## 2023-05-17 NOTE — LETTER
Patient: Eliecer Collins  : 1945    Encounter Date: 2023    Eliecer Collins is 77 YO M with PMH of paroxysmal atrial fibrillation on Eliquis, HTN, lymphedema, venous hypertension, chronic kidney disease III, DM 2, obstructive sleep apnea , BPH and remote small bowel obstruction status post colostomy reversal who was admitted to [facility] for mechanical fall, ALETA on CKDIII and pulmonary edema. Patient presented initially to ER with complaints of fall. He was evaluated for lower back pain 3 nights in the row which was thought it was from mowing the lawn on his riding mower. He was released on  then he presented again on  with continued complaints of back pain. Labs were grossly unremarkable and again he was discharged home. Later, he missed the felled on his tailbone when he was trying to go the bathroom. He is extremely weak and his wife is unable to take care of him.   Patient denied chest pain, SOB, nausea, vomiting, abd pain, diarrhea or constipation. He still complains of lower back pain    PMHx: HTN, DMII, A fib on Eliquis, CKD III, BLE lymphedema, BPH, SBO.  Surgical Hx: aparotomy with colectomy with colostomy and reversal.  Social Hx former smoker, denies alcohol and drug use,  ROS: back pain, joints pain. The rest of the systems were reviewed and they were negative.    Physical exam:  AOx4  Lungs are clear, no wheezing or crackles were heared.  Heart NL S1, S2, normal rhythm and rate. No murmurs.  Abdomen is soft and non tender, No rebound or rigidity.  Ext: bilateral pitting edema +2.  Neuro exam: NL CN2-12, reflexes +2 bilaterally, sensation is intact, and strength is 4/5 over the upper and lower ext bilaterally.    Imaging:  CXR May 13: hazy density throughout the lungs with bibasilar opacities favored to represent layering pleural effusion with superimposed atelectasis or pneumonia. Enlarged cardiac silhouette.  CT T spine and CT L spine  did not show any signs of fracture   Echo  5/13 shows LVEF 55-60% , impaired relaxation pattern of LV diastolic filling, Aortic valve normal, aortic sclerosis with normal leaflet mobility.    Assessment and plan:  #ALETA on CKD stage III  Baseline Cr 2.1-2.22  Cr improved to 2.8 > 2.71 on 5/16  Avoid nephrotoxic medication.  Will hold Losartan due to ALETA and monitor RFT on daily basis.  Encourage fluid intake.    #Pulmonary edema possibly 2/2 CHF  Bilateral edema R>L   CXR shows interstitial edema.  (May 13)  On Lasix 40 mg once daily  Echo 5/13 shows LVEF 55-60% , impaired relaxation pattern of LV diastolic filling, Aortic valve normal, aortic sclerosis with normal leaflet mobility.  Patient was seen by Dr. Barriga who thinks that patient has a component of heart failure however he is asymptomatic.  Per cardio recommendation, continue amlodipine, hydralazine, clonidine. Resume Eliquis. We will start low dose metoprolol, SE profile discussed    #Low back pain  Imaging negative for acute findings  Medicate for pain as needed with Tylenol and Percocet.  PT/OT    # A-Fib  On Eliquis  Maintain K>4, Mg >2  Discontinue Aspirin since there is no medical indication for this medication.    #HTN  Amlodipin 5 mg BID  Clonidine ER 0.1 mg transdermal once daily  Hydralazine 100 mg TID  Will hold Losartan 50mg for 3 days for ALETA to be reevaluated.    #HLD  On atorvastatin 20 mg daily  #BPH  On Flomax 0.4 mg daily  #DM  Lantus 40 U at bedtime.  SSI coverage  Diabetic diet    #DVT proph  On Eliquis    Code: Full    >45 minutes spent in management of pt      Electronically Signed By: Rhys Millan DO   5/20/23 10:39 AM

## 2023-05-19 NOTE — LETTER
Patient: Eliecer Collins  : 1945    Encounter Date: 2023    Subjective  Patient ID: Eliecer Collins is a 78 y.o. male.    Patient seen and evaluated.  Unfortunately, insurance did not approve for this today, he feels like he is still weak and wants to do therapy but is agreeable to home health care.  He is tolerating metoprolol and understands that he needs to follow-up with his primary doctor and cardiology as an outpatient.        Review of Systems   Constitutional: Negative.    Cardiovascular: Negative.    Gastrointestinal: Negative.    Musculoskeletal: Negative.    Skin: Negative.    Neurological: Negative.    Psychiatric/Behavioral: Negative.         ObjectiveVitals Reviewed via facility EMR   Physical Exam  Vitals and nursing note reviewed.   Constitutional:       General: He is not in acute distress.     Appearance: Normal appearance.   HENT:      Mouth/Throat:      Mouth: Mucous membranes are moist.      Pharynx: Oropharynx is clear. No oropharyngeal exudate.   Eyes:      Extraocular Movements: Extraocular movements intact.      Pupils: Pupils are equal, round, and reactive to light.   Cardiovascular:      Rate and Rhythm: Normal rate and regular rhythm.      Pulses: Normal pulses.      Heart sounds: Normal heart sounds. No murmur heard.  Pulmonary:      Effort: Pulmonary effort is normal. No respiratory distress.   Abdominal:      General: Abdomen is flat. Bowel sounds are normal. There is no distension.      Tenderness: There is no abdominal tenderness.   Musculoskeletal:         General: Normal range of motion.      Right lower leg: No edema.      Left lower leg: No edema.   Skin:     General: Skin is warm and dry.      Capillary Refill: Capillary refill takes less than 2 seconds.   Neurological:      General: No focal deficit present.      Mental Status: He is alert. Mental status is at baseline.      Cranial Nerves: No cranial nerve deficit.      Motor: No weakness.   Psychiatric:          Mood and Affect: Mood normal.         Thought Content: Thought content normal.         Assessment/Plan  Diagnoses and all orders for this visit:  Paroxysmal atrial fibrillation (CMS/HCC)  Chronic heart failure, unspecified heart failure type (CMS/HCC)  Benign essential hypertension  Chronic kidney disease, stage 4 (severe) (CMS/[facility])  Type 2 diabetes mellitus with stage 4 chronic kidney disease, with long-term current use of insulin (CMS/[facility])  Hypercholesterolemia      Patient seen and evaluated overall medically stable would benefit from further physical therapy as patient does not feel as if he is back to baseline, continue home health care.  Advise close follow-up as outpatient.  Tolerating metoprolol well we will continue this and advised follow-up primary as well as cardiology.      Electronically Signed By: Rhys Millan DO   5/20/23 10:58 AM

## 2023-05-20 NOTE — PROGRESS NOTES
Subjective   Patient ID: Eliecer Collins is a 78 y.o. male.    Patient seen and evaluated.  Unfortunately, insurance did not approve for this today, he feels like he is still weak and wants to do therapy but is agreeable to home health care.  He is tolerating metoprolol and understands that he needs to follow-up with his primary doctor and cardiology as an outpatient.        Review of Systems   Constitutional: Negative.    Cardiovascular: Negative.    Gastrointestinal: Negative.    Musculoskeletal: Negative.    Skin: Negative.    Neurological: Negative.    Psychiatric/Behavioral: Negative.         Objective Vitals Reviewed via facility EMR   Physical Exam  Vitals and nursing note reviewed.   Constitutional:       General: He is not in acute distress.     Appearance: Normal appearance.   HENT:      Mouth/Throat:      Mouth: Mucous membranes are moist.      Pharynx: Oropharynx is clear. No oropharyngeal exudate.   Eyes:      Extraocular Movements: Extraocular movements intact.      Pupils: Pupils are equal, round, and reactive to light.   Cardiovascular:      Rate and Rhythm: Normal rate and regular rhythm.      Pulses: Normal pulses.      Heart sounds: Normal heart sounds. No murmur heard.  Pulmonary:      Effort: Pulmonary effort is normal. No respiratory distress.   Abdominal:      General: Abdomen is flat. Bowel sounds are normal. There is no distension.      Tenderness: There is no abdominal tenderness.   Musculoskeletal:         General: Normal range of motion.      Right lower leg: No edema.      Left lower leg: No edema.   Skin:     General: Skin is warm and dry.      Capillary Refill: Capillary refill takes less than 2 seconds.   Neurological:      General: No focal deficit present.      Mental Status: He is alert. Mental status is at baseline.      Cranial Nerves: No cranial nerve deficit.      Motor: No weakness.   Psychiatric:         Mood and Affect: Mood normal.         Thought Content: Thought content  normal.         Assessment/Plan   Diagnoses and all orders for this visit:  Paroxysmal atrial fibrillation (CMS/[facility])  Chronic heart failure, unspecified heart failure type (CMS/[facility])  Benign essential hypertension  Chronic kidney disease, stage 4 (severe) (CMS/[facility])  Type 2 diabetes mellitus with stage 4 chronic kidney disease, with long-term current use of insulin (CMS/[facility])  Hypercholesterolemia      Patient seen and evaluated overall medically stable would benefit from further physical therapy as patient does not feel as if he is back to baseline, continue home health care.  Advise close follow-up as outpatient.  Tolerating metoprolol well we will continue this and advised follow-up primary as well as cardiology.

## 2023-05-20 NOTE — PROGRESS NOTES
Eliecer Collins is 79 YO M with PMH of paroxysmal atrial fibrillation on Eliquis, HTN, lymphedema, venous hypertension, chronic kidney disease III, DM 2, obstructive sleep apnea , BPH and remote small bowel obstruction status post colostomy reversal who was admitted to [facility] for mechanical fall, ALETA on CKDIII and pulmonary edema. Patient presented initially to ER with complaints of fall. He was evaluated for lower back pain 3 nights in the row which was thought it was from mowing the lawn on his riding mower. He was released on 10th then he presented again on 11th with continued complaints of back pain. Labs were grossly unremarkable and again he was discharged home. Later, he missed the felled on his tailbone when he was trying to go the bathroom. He is extremely weak and his wife is unable to take care of him.   Patient denied chest pain, SOB, nausea, vomiting, abd pain, diarrhea or constipation. He still complains of lower back pain    PMHx: HTN, DMII, A fib on Eliquis, CKD III, BLE lymphedema, BPH, SBO.  Surgical Hx: aparotomy with colectomy with colostomy and reversal.  Social Hx former smoker, denies alcohol and drug use,  ROS: back pain, joints pain. The rest of the systems were reviewed and they were negative.    Physical exam:  AOx4  Lungs are clear, no wheezing or crackles were heared.  Heart NL S1, S2, normal rhythm and rate. No murmurs.  Abdomen is soft and non tender, No rebound or rigidity.  Ext: bilateral pitting edema +2.  Neuro exam: NL CN2-12, reflexes +2 bilaterally, sensation is intact, and strength is 4/5 over the upper and lower ext bilaterally.    Imaging:  CXR May 13: hazy density throughout the lungs with bibasilar opacities favored to represent layering pleural effusion with superimposed atelectasis or pneumonia. Enlarged cardiac silhouette.  CT T spine and CT L spine 5/13 did not show any signs of fracture   Echo 5/13 shows LVEF 55-60% , impaired relaxation pattern of LV diastolic  filling, Aortic valve normal, aortic sclerosis with normal leaflet mobility.    Assessment and plan:  #ALETA on CKD stage III  Baseline Cr 2.1-2.22  Cr improved to 2.8 > 2.71 on 5/16  Avoid nephrotoxic medication.  Will hold Losartan due to ALETA and monitor RFT on daily basis.  Encourage fluid intake.    #Pulmonary edema possibly 2/2 CHF  Bilateral edema R>L   CXR shows interstitial edema.  (May 13)  On Lasix 40 mg once daily  Echo 5/13 shows LVEF 55-60% , impaired relaxation pattern of LV diastolic filling, Aortic valve normal, aortic sclerosis with normal leaflet mobility.  Patient was seen by Dr. Barriga who thinks that patient has a component of heart failure however he is asymptomatic.  Per cardio recommendation, continue amlodipine, hydralazine, clonidine. Resume Eliquis. We will start low dose metoprolol, SE profile discussed    #Low back pain  Imaging negative for acute findings  Medicate for pain as needed with Tylenol and Percocet.  PT/OT    # A-Fib  On Eliquis  Maintain K>4, Mg >2  Discontinue Aspirin since there is no medical indication for this medication.    #HTN  Amlodipin 5 mg BID  Clonidine ER 0.1 mg transdermal once daily  Hydralazine 100 mg TID  Will hold Losartan 50mg for 3 days for ALETA to be reevaluated.    #HLD  On atorvastatin 20 mg daily  #BPH  On Flomax 0.4 mg daily  #DM  Lantus 40 U at bedtime.  SSI coverage  Diabetic diet    #DVT proph  On Eliquis    Code: Full    >45 minutes spent in management of pt

## 2023-06-09 NOTE — PROGRESS NOTES
Subjective   This document was generated for patient the chart improve order for home health care certification.  Diagnosis list were reviewed, home health care certification was reviewed and orders were reviewed and approved and signed.          Assessment/Plan   Problem List Items Addressed This Visit    None  Visit Diagnoses       Secondary lymphedema    -  Primary    Acute pulmonary edema (CMS/HCC)        Chronic low back pain, unspecified back pain laterality, unspecified whether sciatica present        Hypertensive nephropathy

## 2023-06-23 NOTE — PROGRESS NOTES
Discharge Facility: [facility]  Discharge Diagnosis: acute on chronic diastolic HF  Admission Date: 6/17/23  Discharge Date: 6/21/23    PCP Appointment Date: MARCIE  Specialist Appointment Date: 6/29/23 pacemaker incision check, 9/25/23 Ashley NP  Hospital Encounter and Summary: not available  See discharge assessment below for further details    .Engagement  Call Start Time: 0941 (6/23/2023 10:04 AM)    Medications  Medications reviewed with patient/caregiver?: Yes (6/23/2023 10:04 AM)  Is the patient having any side effects they believe may be caused by any medication additions or changes?: No (6/23/2023 10:04 AM)  Does the patient have all medications ordered at discharge?: No (6/23/2023 10:04 AM)  What is keeping the patient from filling the prescriptions?: Lost script/didn't receive (6/23/2023 10:04 AM)  Care Management Interventions: Provided patient education (6/23/2023 10:04 AM)  Pres[facility] Comments: [facility], [facility] is being filled now and is ready within an hour. Also sent message via [employer] to Dr Per You to resubmit tramadol script since [facility] not [facility].  is doing this as well. Metoprolol succinate had been started on 6/5/23 and patient already has this medication. (6/23/2023 10:04 AM)  Is the patient taking all medications as directed (includes completed medication regime)?: Yes (6/23/2023 10:04 AM)  Care Management Interventions: Notified pharmacy for assistance; Provided patient education (6/23/2023 10:04 AM)  Medication Comments: [facility] referral due to new HF (6/23/2023 10:04 AM)    Appointments  Does the patient have a primary care provider?: Yes (6/23/2023 10:04 AM)  Care Management Interventions: Educated patient on importance of making appointment (6/23/2023 10:04 AM)  Has the patient kept scheduled appointments due by today?: Yes (6/23/2023 10:04 AM)  Care Management Interventions: Educated on importance of keeping appointment (6/23/2023 10:04 AM)    Self  Management  What is the home health agency?: [facility] (6/23/2023 10:04 AM)  Has home health visited the patient within 72 hours of discharge?: Call prior to 72 hours (6/23/2023 10:04 AM)    Patient Teaching  Does the patient have access to their discharge instructions?: Yes (6/23/2023 10:04 AM)  Care Management Interventions: Reviewed instructions with patient (6/23/2023 10:04 AM)  What is the patient's perception of their health status since discharge?: Improving (6/23/2023 10:04 AM)  Is the patient/caregiver able to teach back the hierarchy of who to call/visit for symptoms/problems? PCP, Specialist, Home Health nurse, Urgent Care, ED, 911: Yes (6/23/2023 10:04 AM)  Patient/Caregiver Education Comments: Aware to seek care with worsening shortness of breath, weigh self daily, limit salt. (6/23/2023 10:04 AM)    Wrap Up  Call End Time: 1000 (6/23/2023 10:04 AM)

## 2023-06-26 NOTE — PROGRESS NOTES
Transitional Care Management: Pharmacy    Subjective   Eliecer Collins is a 78 y.o. male who was referred to Clinical Pharmacy Team to complete TCM medication reconciliation prior to office visit with Curtis Dorsey MD on 7/5/23. A comprehensive medication review was completed with the patient. patient had all medications and/or an updated medication list in front of them during the telephone encounter.     General Medication Management    Visit type: initial  Method of contact: by telephone                             Admission Date: 6/17/23  Discharge Date: 6/22/23  Discharge Diagnosis: acute respiratory failure/AOC diastolic HF, Afib, T2DM  Discharged From: [facility]    Notable medication changes following discharge:  START: none  STOP: none  CHANGE:   - Amlodipine decreased from 10mg to 5mg daily (pt's taking 0.5 tab of 10mg tab home supply)  - Clonidine: pt's actually on Clonidine 0.1mg tablet PTA and not patches    MEDICATION RECONCILIATION  Added: Oxybutinin, Ferrous gluconate, Miralax, Senna plus, lidoderm 5%, VitD3 [id number]   Changed: Clonidine from patches to 0.1mg tablet, Toujeo dosing, Flexeril dosing  Removed: Torsemide 100mg, Vitamin D2 [id number] (wrong formulation), diltiazem XR 240mg    Current Outpatient Medications on File Prior to Visit   Medication Sig Dispense Refill    amiodarone (Pacerone) 200 mg tablet Take 1 tablet (200 mg) by mouth once daily.      amLODIPine (Norvasc) 10 mg tablet Take 0.5 tablets (5 mg) by mouth once daily. (Dose decrease)      apixaban (Eliquis) 2.5 mg tablet Take 1 tablet (2.5 mg) by mouth in the morning and 1 tablet (2.5 mg) before bedtime. 60 tablet 11    aspirin 81 mg EC tablet Take 1 tablet (81 mg) by mouth once daily.      atorvastatin (Lipitor) 20 mg tablet Take 1 tablet (20 mg) by mouth once daily at bedtime.      cholecalciferol (Vitamin D3) 1,250 mcg (50,000 unit) tablet Take 1 tablet (50,000 Units) by mouth 1 (one) time per week. On Mondays       "cloNIDine (Catapres) 0.1 mg tablet Take 1 tablet (0.1 mg) by mouth once daily.      cyclobenzaprine (Flexeril) 10 mg tablet Take 1 tablet (10 mg) by mouth 3 times a day as needed for muscle spasms.      ferrous gluconate (Fergon) 324 (38 Fe) mg tablet Take 1 tablet (38 mg of iron) by mouth 2 times a day.      furosemide (Lasix) 40 mg tablet Take 1 tablet (40 mg) by mouth once daily.      gabapentin (Neurontin) 300 mg capsule Take 1 capsule (300 mg) by mouth in the morning and 1 capsule (300 mg) before bedtime. 180 capsule 0    hydrALAZINE (Apresoline) 100 mg tablet 1 tablet (100 mg) every 8 hours.      insulin lispro (HumaLOG) 100 unit/mL injection Patient is on a sliding scale. 15 mL 3    lidocaine (Lidoderm) 5 % patch Place 1 patch on the skin once daily as needed (back pain). Remove & discard patch within 12 hours or as directed by MD.      losartan (Cozaar) 50 mg tablet Take 1 tablet (50 mg) by mouth once daily.      metoprolol succinate XL (Toprol-XL) 25 mg 24 hr tablet Take 1 tablet (25 mg) by mouth once daily. Do not crush or chew. 90 tablet 1    oxybutynin (Ditropan) 5 mg tablet Take 1 tablet (5 mg) by mouth 3 times a day.      polyethylene glycol (Miralax) 17 gram/dose powder Take 17 g by mouth once daily as needed (constipation).      sennosides-docusate sodium (Linh-Colace) 8.6-50 mg tablet Take 2 tablets by mouth once daily as needed for constipation.      spironolacton-hydrochlorothiaz (Aldactazide) 25-25 mg tablet Take 1 tablet (25 mg) by mouth once daily.      tamsulosin (Flomax) 0.4 mg 24 hr capsule Take 1 capsule (0.4 mg) by mouth once daily at bedtime.      Toujeo SoloStar U-300 Insulin 300 unit/mL (1.5 mL) injection Inject 39 Units under the skin early in the morning.. (Patient taking differently: Inject 40 Units under the skin once daily at bedtime.) 4.5 mL 9    traZODone (Desyrel) 100 mg tablet TAKE 1 TABLET BY MOUTH DAILY AT BEDTIME 30 tablet 5    Unifine Pentips Plus 31 gauge x 3/16\" needle " "Inject 2 each under the skin 2 times a day. 200 each 3    [DISCONTINUED] amLODIPine (Norvasc) 10 mg tablet Take 1 tablet (10 mg) by mouth once daily. (Patient taking differently: Take 0.5 tablets (5 mg) by mouth once daily.) 90 tablet 0    [DISCONTINUED] cloNIDine (Catapres-TTS) 0.1 mg/24 hr patch       [DISCONTINUED] cyclobenzaprine (Flexeril) 10 mg tablet Take 1 tablet (10 mg) by mouth as needed at bedtime for muscle spasms. 15 tablet 0    [DISCONTINUED] dilTIAZem XR (Dilacor XR) 240 mg 24 hr capsule       [DISCONTINUED] ergocalciferol (Vitamin D-2) 1.25 MG (28242 UT) capsule Take by mouth.      [DISCONTINUED] torsemide (Demadex) 100 mg tablet       [DISCONTINUED] Unifine Pentips Plus 31 gauge x 3/16\" needle Inject 2 each under the skin 2 times a day.       No current facility-administered medications on file prior to visit.      No Known Allergies    [facility] - [place], [place] - [facility]  [address] [facility] [facility]  [address]  [facility] [address]  Phone: [phone] Fax: [phone]    [facility] [address] [address]  Phone: [phone] Fax: [phone]    [facility] [address] [place] [address]  Phone: [phone] Fax: [phone]       No issues reported in regards to accessibility affordability adherence adverse effects organization.      Objective     LAB  Wt Readings from Last 3 Encounters:   04/24/23 124 kg (274 lb)   02/23/23 126 kg (278 lb)   01/16/23 120 kg (265 lb)     Pulse Readings from Last 3 Encounters:   04/24/23 56   02/23/23 57   01/16/23 67     BP Readings from Last 3 Encounters:   04/24/23 140/79   02/23/23 167/75   01/16/23 154/79      Lab Results   Component Value Date    BILITOT 0.4 06/19/2023    CALCIUM 8.5 (L) 06/22/2023    CO2 24 06/22/2023     06/22/2023    CREATININE 2.71 (H) 06/22/2023    CREATININE 2.51 (H) 06/21/2023    " CREATININE 2.49 (H) 06/20/2023    GLUCOSE 154 (H) 06/22/2023    ALKPHOS 102 06/19/2023    K 4.9 06/22/2023    PROT 5.6 (L) 06/19/2023     (L) 06/22/2023    AST 16 06/19/2023    ALT 18 06/19/2023    BUN 51 (H) 06/22/2023    ANIONGAP 14 06/22/2023    MG 2.12 06/22/2023    PHOS 3.9 05/14/2023    ALBUMIN 3.1 (L) 06/19/2023    AMYLASE 18 (L) 05/23/2022    LIPASE 6 (L) 05/11/2023    GFRMALE 23 (A) 06/22/2023    GFRMALE 25 (A) 06/21/2023    GFRMALE 26 (A) 06/20/2023     Lab Results   Component Value Date    TRIG 143 04/07/2022    TRIG 114 08/30/2021    TRIG 75 02/19/2021    CHOL 156 04/07/2022    CHOL 122 08/30/2021    CHOL 123 02/19/2021    HDL 41.0 04/07/2022    HDL 30.0 (A) 08/30/2021    HDL 37.0 (A) 02/19/2021     Lab Results   Component Value Date    HGBA1C 7.1 (A) 05/13/2023    HGBA1C 7.1 (A) 04/22/2023    HGBA1C 6.0 (A) 08/09/2022            DRUG INTERACTIONS  Trazodone and amiodarone: increased risk of Qtc prolongation     Assessment/Plan    Indication, effectiveness, safety and convenience of his medications were reviewed today. The patient's medical conditions were assessed, evaluated, and deemed meeting goals of drug therapy, with the following exceptions.      Medication Therapy Recommendations Needing Review  No medication therapy recommendations to display     Completed Medication Therapy Recommendations  No medication therapy recommendations to display    Comments/Recommendations to PCP:  Patient's unaware of his diagnosis for diastolic HF. Last echo 11/22 with EF 55-60%, impaired relaxation. If pt's has HFpEF, recommend to add on Jardiance 10mg (ok to initiate for CHF for pts with eGFR = or >20)    Emily Clark, Norma  [facility] PGY-1 Pharmacy Resident    Verbal consent to manage patient's drug therapy was obtained from the patient. They were informed they may decline to participate or withdraw from participation in pharmacy services at any time.

## 2023-07-05 NOTE — PROGRESS NOTES
"Patient: Eliecer Collins  : 1945  PCP: Curtis Dorsey MD  MRN: [id number]  Program: No linked episodes     Eliecer Collins is a 78 y.o. male presenting today for follow-up after being discharged from the hospital 14 days ago. The main problem requiring admission was SOB and CHF. The discharge summary and/or Transitional Care Management documentation was reviewed. Medication reconciliation was performed as indicated via the \"Fabrice as Reviewed\" timestamp.     Eliecer Collins was contacted by Transitional Care Management services two days after his discharge. This encounter and supporting documentation was reviewed.    The complexity of medical decision making for this patient's transitional care is high.    Physical Exam  Vitals reviewed.   Constitutional:       Appearance: Normal appearance. He is normal weight.   HENT:      Head: Normocephalic and atraumatic.   Eyes:      Conjunctiva/sclera: Conjunctivae normal.   Cardiovascular:      Rate and Rhythm: Normal rate and regular rhythm.      Pulses: Normal pulses.   Pulmonary:      Effort: Pulmonary effort is normal.      Breath sounds: Normal breath sounds.   Abdominal:      Palpations: Abdomen is soft.   Musculoskeletal:      Cervical back: Normal range of motion.      Right lower leg: Edema present.      Left lower leg: Edema present.   Skin:     General: Skin is warm and dry.   Neurological:      General: No focal deficit present.   Psychiatric:         Mood and Affect: Mood normal.   Assessment/Plan   Problem List Items Addressed This Visit       Type 2 diabetes mellitus with stage 4 chronic kidney disease, with long-term current use of insulin (CMS/HCC) - Primary    Chronic kidney disease, stage 4 (severe) (CMS/HCC)    Paroxysmal atrial fibrillation (CMS/HCC)     Other Visit Diagnoses       Acute on chronic diastolic heart failure (CMS/HCC)        Sick sinus syndrome (CMS/HCC)            Recent hospitalization data and information reviewed, all reports, " labs, radiological investigations and consultations and follow ups reviewed during this visit. Pt is doing well, precautions to be taken in the future for acute ailments or acute illness and change of condition are discussed, will continue to have close follow up, medication list was reviewed and updated and necessary changes were applied.  Patient is here for transitional care visit and I am quite concerned about this patient's physical condition since last May when patient has this bowel surgery and after that things has not gone well, in between the patient's wife called because he has been falling, then we thought that patient can be admitted to skilled nursing facility but that was not possible, this time patient has a pacemaker implant, today his blood pressure readings were low, he has a couple of episodes of hypoglycemia, hospitalization was reviewed, it was a diastolic heart failure, he is baseline medical condition continue to deteriorate, he has a chronic kidney disease, he remains on Eliquis and because of financial reasons he cannot get Eliquis so I called electrophysiology and told them to provide some samples of Eliquis.  As his BP readings are low I decided to discontinue amlodipine and clonidine and also do not take cyclobenzaprine because it can cause a significant amount of drowsiness and impaired cognitive functions.  Rest of medications will be continued, please have a proper food consumption when taking insulin therapy, please do not rest to go back to work, his saturations were adequate, all other report and information during hospitalization were reviewed, follow-up in 1 month and this is a transitional care related visit encounter.    Review of Systems  Constitutional: fatigue   HENT: Negative.     Eyes: Negative.    Respiratory: Negative.     Cardiovascular:  Positive for leg swelling. Negative for chest pain.   Gastrointestinal: Negative.    Musculoskeletal: Negative.    Skin: Negative.   Negative for pallor.   Neurological: Negative.  Negative for dizziness, weakness and headaches.     No family history on file.    Engagement  Call Start Time: 0941 (6/23/2023 10:04 AM)    Medications  Medications reviewed with patient/caregiver?: Yes (6/23/2023 10:04 AM)  Is the patient having any side effects they believe may be caused by any medication additions or changes?: No (6/23/2023 10:04 AM)  Does the patient have all medications ordered at discharge?: No (6/23/2023 10:04 AM)  What is keeping the patient from filling the prescriptions?: Lost script/didn't receive (6/23/2023 10:04 AM)  Care Management Interventions: Provided patient education (6/23/2023 10:04 AM)  Pres[facility], [facility] is being filled now and is ready within an hour. Also sent message via [employer] to Dr Per You to resubmit tramadol [facility] since [facility].  is doing this as well. Metoprolol succinate had been started on 6/5/23 and patient already has this medication. (6/23/2023 10:04 AM)  Is the patient taking all medications as directed (includes completed medication regime)?: Yes (6/23/2023 10:04 AM)  Care Management Interventions: Notified pharmacy for assistance; Provided patient education (6/23/2023 10:04 AM)  Medication Comments: [facility] referral due to new HF (6/23/2023 10:04 AM)    Appointments  Does the patient have a primary care provider?: Yes (6/23/2023 10:04 AM)  Care Management Interventions: Educated patient on importance of making appointment (6/23/2023 10:04 AM)  Has the patient kept scheduled appointments due by today?: Yes (6/23/2023 10:04 AM)  Care Management Interventions: Educated on importance of keeping appointment (6/23/2023 10:04 AM)    Self Management  What is the home health agency?: [facility] (6/23/2023 10:04 AM)  Has home health visited the patient within 72 hours of discharge?: Call prior to 72 hours (6/23/2023 10:04 AM)    Patient  Teaching  Does the patient have access to their discharge instructions?: Yes (6/23/2023 10:04 AM)  Care Management Interventions: Reviewed instructions with patient (6/23/2023 10:04 AM)  What is the patient's perception of their health status since discharge?: Improving (6/23/2023 10:04 AM)  Is the patient/caregiver able to teach back the hierarchy of who to call/visit for symptoms/problems? PCP, Specialist, Home Health nurse, Urgent Care, ED, 911: Yes (6/23/2023 10:04 AM)  Patient/Caregiver Education Comments: Aware to seek care with worsening shortness of breath, weigh self daily, limit salt. (6/23/2023 10:04 AM)    Wrap Up  Call End Time: 1000 (6/23/2023 10:04 AM)        No follow-ups on file.

## 2023-07-07 NOTE — PROGRESS NOTES
Unable to reach patient for call back after patient's follow up appointment with PCP.   KANAM with call back number for patient to call if needed   If no voicemail available call attempts x 2 were made to contact the patient to assist with any questions or concerns patient may have.

## 2023-07-17 NOTE — PROGRESS NOTES
Subjective   Patient ID: Eliecer Collins is a 78 y.o. male who presents for blood glucose has been high.    Hypertension  This is a chronic problem. The current episode started more than 1 year ago. The problem has been waxing and waning since onset. The problem is uncontrolled. Pertinent negatives include no anxiety, blurred vision, chest pain or shortness of breath. Risk factors for coronary artery disease include diabetes mellitus and obesity. Past treatments include angiotensin blockers and beta blockers. The current treatment provides no improvement. There are no compliance problems.  Hypertensive end-organ damage includes kidney disease and heart failure. Identifiable causes of hypertension include chronic renal disease.   Diabetes  He presents for his follow-up diabetic visit. He has type 2 diabetes mellitus. His disease course has been fluctuating. There are no hypoglycemic associated symptoms. Pertinent negatives for hypoglycemia include no confusion. Associated symptoms include fatigue and foot paresthesias. Pertinent negatives for diabetes include no blurred vision and no chest pain. There are no hypoglycemic complications. Symptoms are worsening. Diabetic complications include heart disease and nephropathy. He is compliant with treatment all of the time. He has not had a previous visit with a dietitian. He rarely participates in exercise. His home blood glucose trend is fluctuating dramatically. An ACE inhibitor/angiotensin II receptor blocker is being taken. Eye exam is current.        Review of Systems   Constitutional:  Positive for fatigue.   HENT: Negative.     Eyes: Negative.  Negative for blurred vision.   Respiratory: Negative.  Negative for shortness of breath.    Cardiovascular: Negative.  Negative for chest pain.   Gastrointestinal: Negative.    Musculoskeletal:  Positive for arthralgias.   Skin: Negative.    Neurological: Negative.    Psychiatric/Behavioral:  Negative for behavioral problems  and confusion.        Objective   /80 (BP Location: Left arm, Patient Position: Sitting, BP Cuff Size: Adult)   Pulse 77   Temp 36.7 °C (98 °F) (Temporal)   Wt 96.2 kg (212 lb)   BMI 32.71 kg/m²     Physical Exam  Vitals reviewed.   Constitutional:       Appearance: Normal appearance. He is normal weight.   HENT:      Head: Normocephalic and atraumatic.   Eyes:      Conjunctiva/sclera: Conjunctivae normal.   Cardiovascular:      Rate and Rhythm: Normal rate and regular rhythm.   Pulmonary:      Effort: Pulmonary effort is normal.      Breath sounds: Normal breath sounds.   Abdominal:      Palpations: Abdomen is soft.   Musculoskeletal:         General: Normal range of motion.      Cervical back: Normal range of motion.      Right lower leg: Edema present.      Left lower leg: Edema present.   Skin:     General: Skin is warm and dry.   Neurological:      General: No focal deficit present.   Psychiatric:         Mood and Affect: Mood normal.       Assessment/Plan   Problem List Items Addressed This Visit       Type 2 diabetes mellitus with stage 4 chronic kidney disease, with long-term current use of insulin (CMS/[facility]) - Primary    Benign essential hypertension   : Came because blood sugar has been high in the morning, patient is actually looking much better compared to last couple of times, leg swelling has gone down but because of labile  Fluctuations of blood pressure systolic BP is high, last time it was low, last time I discontinued clonidine and amlodipine, he is on 40 units of Toujeo which will be increased to 50 units, if blood sugar is more than 350 he will receive 6 to 8 units of Humalog, if blood sugar is more than 450 he will receive 12 units of Humalog I told them.  Leg swelling is much better, mobility is still very poor, disability paperwork was done, rest of medications to be continued, pacemaker area is healing well, at the age of 78 with a back-and-forth hospitalization, atrial arrhythmias,  fluid overload, fluctuating renal functions, brittle diabetes things are taking longer to recover but I think he is getting better, he will be seen at neck scheduled appointment.

## 2023-08-09 NOTE — LETTER
Patient: Eliecer Collins  : 1945    Encounter Date: 2023    Subjective  Patient ID: Eliecer Collins is a 78 y.o. male.    Pt is a 78 year old male with CHF, CKD, afib, DM, BPH, and HTN who recently presented to the ED with worsening malaise and SOB. Patient was found to be in a CHF exacerbation. Patient was admitted and slowly improved with supportive care. However, hospital course was complicated during past couple of days. Patients kidney function started worsening and was seen by nephrology. Medications were adjusted and nephrology deemed patient stable for discharge with close follow up. On evaluation, patient is having some LE edema and well as shortness of breath. Does not feel back to his baseline and feels overloaded. Patient Cr noted to be 4.5 today as well. Regards no additional issues.        Review of Systems   Constitutional: Negative.    HENT: Negative.     Respiratory:  Positive for chest tightness, shortness of breath and wheezing.    Cardiovascular:  Positive for leg swelling.   Gastrointestinal: Negative.    Musculoskeletal: Negative.    Skin: Negative.    Neurological: Negative.    Psychiatric/Behavioral: Negative.         ObjectiveVitals Reviewed via facility EMR   Physical Exam  Vitals and nursing note reviewed.   Constitutional:       General: He is not in acute distress.     Appearance: Normal appearance.   HENT:      Mouth/Throat:      Mouth: Mucous membranes are moist.      Pharynx: Oropharynx is clear. No oropharyngeal exudate.   Eyes:      Extraocular Movements: Extraocular movements intact.      Pupils: Pupils are equal, round, and reactive to light.   Cardiovascular:      Rate and Rhythm: Normal rate and regular rhythm.      Pulses: Normal pulses.      Heart sounds: Normal heart sounds. No murmur heard.  Pulmonary:      Effort: Respiratory distress present.      Breath sounds: Wheezing present.   Abdominal:      General: Abdomen is flat. Bowel sounds are normal. There is no  distension.      Tenderness: There is no abdominal tenderness.   Musculoskeletal:         General: Normal range of motion.      Right lower leg: No edema.      Left lower leg: No edema.   Skin:     General: Skin is warm and dry.      Capillary Refill: Capillary refill takes less than 2 seconds.   Neurological:      General: No focal deficit present.      Mental Status: He is alert. Mental status is at baseline.      Cranial Nerves: No cranial nerve deficit.      Motor: No weakness.   Psychiatric:         Mood and Affect: Mood normal.         Thought Content: Thought content normal.         Assessment/Plan  Diagnoses and all orders for this visit:  Paroxysmal atrial fibrillation (CMS/HCC)  Chronic heart failure, unspecified heart failure type (CMS/HCC)  Hypercholesterolemia  Benign essential hypertension  Obesity, morbid (CMS/HCC)  Chronic kidney disease, stage 4 (severe) (CMS/HCC)    Pt likely has underlying cardio renal from hypervolemia. Still producing urine however kidney function is significantly worsening from yesterday. Discussed with wife and patient that patient may need aggressive IV Diuresis and we are unable to perform at facility. Will attempt medical management at this time and closely evaluate. Add on metolazone to torsemide. Closely monitor weights and labs, supportive care. Low threshold to send patient to hospital and this was discussed with staff and patient    >45 Minutes spent in management of pt      Electronically Signed By: Rhys Millan DO   8/10/23 10:48 PM

## 2023-08-11 NOTE — PROGRESS NOTES
Subjective   Patient ID: Eliecer Collins is a 78 y.o. male.    Pt is a 78 year old male with CHF, CKD, afib, DM, BPH, and HTN who recently presented to the ED with worsening malaise and SOB. Patient was found to be in a CHF exacerbation. Patient was admitted and slowly improved with supportive care. However, hospital course was complicated during past couple of days. Patients kidney function started worsening and was seen by nephrology. Medications were adjusted and nephrology deemed patient stable for discharge with close follow up. On evaluation, patient is having some LE edema and well as shortness of breath. Does not feel back to his baseline and feels overloaded. Patient Cr noted to be 4.5 today as well. Regards no additional issues.        Review of Systems   Constitutional: Negative.    HENT: Negative.     Respiratory:  Positive for chest tightness, shortness of breath and wheezing.    Cardiovascular:  Positive for leg swelling.   Gastrointestinal: Negative.    Musculoskeletal: Negative.    Skin: Negative.    Neurological: Negative.    Psychiatric/Behavioral: Negative.         Objective Vitals Reviewed via facility EMR   Physical Exam  Vitals and nursing note reviewed.   Constitutional:       General: He is not in acute distress.     Appearance: Normal appearance.   HENT:      Mouth/Throat:      Mouth: Mucous membranes are moist.      Pharynx: Oropharynx is clear. No oropharyngeal exudate.   Eyes:      Extraocular Movements: Extraocular movements intact.      Pupils: Pupils are equal, round, and reactive to light.   Cardiovascular:      Rate and Rhythm: Normal rate and regular rhythm.      Pulses: Normal pulses.      Heart sounds: Normal heart sounds. No murmur heard.  Pulmonary:      Effort: Respiratory distress present.      Breath sounds: Wheezing present.   Abdominal:      General: Abdomen is flat. Bowel sounds are normal. There is no distension.      Tenderness: There is no abdominal tenderness.    Musculoskeletal:         General: Normal range of motion.      Right lower leg: No edema.      Left lower leg: No edema.   Skin:     General: Skin is warm and dry.      Capillary Refill: Capillary refill takes less than 2 seconds.   Neurological:      General: No focal deficit present.      Mental Status: He is alert. Mental status is at baseline.      Cranial Nerves: No cranial nerve deficit.      Motor: No weakness.   Psychiatric:         Mood and Affect: Mood normal.         Thought Content: Thought content normal.         Assessment/Plan   Diagnoses and all orders for this visit:  Paroxysmal atrial fibrillation (CMS/[facility])  Chronic heart failure, unspecified heart failure type (CMS/[facility])  Hypercholesterolemia  Benign essential hypertension  Obesity, morbid (CMS/[facility])  Chronic kidney disease, stage 4 (severe) (CMS/[facility])    Pt likely has underlying cardio renal from hypervolemia. Still producing urine however kidney function is significantly worsening from yesterday. Discussed with wife and patient that patient may need aggressive IV Diuresis and we are unable to perform at facility. Will attempt medical management at this time and closely evaluate. Add on metolazone to torsemide. Closely monitor weights and labs, supportive care. Low threshold to send patient to hospital and this was discussed with staff and patient    >45 Minutes spent in management of pt

## 2023-08-16 NOTE — LETTER
Patient: Eliecer Collins  : 1945    Encounter Date: 2023    Subjective  Patient ID: Eliecer Collins is a 78 y.o. male.    Eliecer Howard is a 78-year-old male with past medical history significant for HFpEF (EF 55 to 60%), IDDM type II, A-fib on Eliquis, BPH, hypertension, hyperlipidemia who presents to SNF from recent hospital stay for acute respiratory distress.  During recent hospital stay at [facility] patient was found to have a right pleural effusion which was drained via the use of a therapeutic/diagnostic right thoracentesis on  which initially drained 2 L of bloody tinged fluid ultimately being a total of 4.3 L over her hospital stay.  Analysis of fluid indicate exudative effusion with cytology still pending.  Chest tube was removed on .  Patient's respiratory status improved stable for discharge to SNF on 8/15.  Review of clinical record shows patient met with hospice and palliative care CODE STATUS was changed to DNR CCA DNI, will follow with palliative care outpatient.  Patient was seen and examined at bedside on arrival to SNF found to be lying in bed in no acute distress with nasal cannula at 3 L.  Patient denies any chest pain, worsening shortness of breath, cough, abdominal pain, nausea, vomiting, diarrhea, urinary complaints, fevers or chills  Review of Systems:  Constitutional: Negative.    HENT: Negative.     Respiratory: Rhonchi without respiratory distress. .     Cardiovascular: Negative.    Gastrointestinal: Negative.    Musculoskeletal: Negative.    Skin: Negative  Neurological: Negative    Psychiatric/Behavioral: Negative.    Vitals Reviewed at facility  Physical Exam  Vitals and nursing note reviewed.   Constitutional:       General: He is not in acute distress.     Appearance: Normal appearance.   HENT:      Nose: Nasal cannula in place     Mouth: Mucous membranes are moist.      Pharynx: Oropharynx is clear. No oropharyngeal exudate.   Eyes:       Extraocular Movements: Extraocular movements intact.      Pupils: Pupils are equal, round, and reactive to light.   Cardiovascular:      Rate and Rhythm: Normal rate and regular rhythm.      Pulses: Normal pulses.      Heart sounds: Normal heart sounds. No murmur heard.  Pulmonary:      Effort: Pulmonary effort is normal. Rhonchi over anterior lung fields.   Abdominal:      General: Abdomen is flat. Bowel sounds are normal. There is no distension.      Tenderness: There is no abdominal tenderness.   Musculoskeletal:         General: Normal range of motion.      Right lower leg: No significant edema     Left lower leg: No significant edema  Skin:     General: Skin is warm and dry.      Capillary Refill: Capillary refill takes less than 2 seconds.   Neurological:      General: No acute focal deficit present.      Mental Status: He is alert. Mental status is at baseline.      Cranial Nerves: No cranial nerve deficit.      Motor: NO weakness.   Psychiatric:         Mood and Affect: Mood normal.         Thought Content: Thought content normal.        Assessment/Plan  Diagnoses and all orders for this visit:  Paroxysmal atrial fibrillation (CMS/HCC)  Chronic heart failure, unspecified heart failure type (CMS/HCC)  Type 2 diabetes mellitus with stage 4 chronic kidney disease, with long-term current use of insulin (CMS/[facility])  Chronic kidney disease, stage 4 (severe) (CMS/HCC)  Obesity, morbid (CMS/[facility])  Insomnia, unspecified type  Benign essential hypertension        78-year-old male with past medical history significant for HFpEF, A-fib on Eliquis, CKD stage IV presenting to SNF status post right thoracentesis with adequate evacuation of pleural fluid pending cytology, current treatment for pneumonia with Augmentin and doxycycline, will continue PT OT and outpatient follow-up with palliative care with goal of titrating off supplemental O2 back to baseline.    #Acute hypoxic respiratory failure in the setting of pleural  effusion  #Right-sided pleural effusion s/p thoracentesis  #ALETA on CKD stage IV  #IDDM type II  #HFpEF (EF 50 to 60%)  #A-fib on AC  #Essential hypertension  #BPH    Plan: Continue to titrate supplemental O2 as tolerated to maintain SPO2 greater than 92%, will continue to hold torsemide and metolazone until ALETA improves noting most recent serum creatinine of 2.6 with a baseline of approximately normal.  Continue to monitor ins and outs.  We will continue with weekly labs to include BMP and CBC.  Continue PT OT.  Continue with plan to repeat CT chest in evaluation of lung parenchyma per pulmonology's recommendation.  We will continue Augmentin and doxycycline with an end of therapy of 8/21. Will continue all patient home medications as appriopriate. Patient was seen and evaluated at bedside this afternoon on arrival to the SNF patient medically stable currently we will continue to follow.    POST VISIT  Pts wife did want to know about prognosis and potential clinical course of patient. Discussed with staff that patient does have multiple medical issues and that patient likely will have a long recovery to which it is unknown if patient will ever be able to get off of oxygen. Discussed GOC with wife and patient, and they noted hat they were overall interested in hospice care. Referral placed, continue supportive care, routine labs, closely monitor, hospice likely meeting with patient shortly    >45 minutes spent in management of pt      Electronically Signed By: Rhys Millan DO   8/16/23 11:12 PM

## 2023-08-17 NOTE — PROGRESS NOTES
Subjective   Patient ID: Eliecer Collisn is a 78 y.o. male.    Eliecer Howard is a 78-year-old male with past medical history significant for HFpEF (EF 55 to 60%), IDDM type II, A-fib on Eliquis, BPH, hypertension, hyperlipidemia who presents to SNF from recent hospital stay for acute respiratory distress.  During recent hospital stay at [facility] patient was found to have a right pleural effusion which was drained via the use of a therapeutic/diagnostic right thoracentesis on 8/11 which initially drained 2 L of bloody tinged fluid ultimately being a total of 4.3 L over her hospital stay.  Analysis of fluid indicate exudative effusion with cytology still pending.  Chest tube was removed on 8/13.  Patient's respiratory status improved stable for discharge to SNF on 8/15.  Review of clinical record shows patient met with hospice and palliative care CODE STATUS was changed to DNR CCA DNI, will follow with palliative care outpatient.  Patient was seen and examined at bedside on arrival to SNF found to be lying in bed in no acute distress with nasal cannula at 3 L.  Patient denies any chest pain, worsening shortness of breath, cough, abdominal pain, nausea, vomiting, diarrhea, urinary complaints, fevers or chills  Review of Systems:  Constitutional: Negative.    HENT: Negative.     Respiratory: Rhonchi without respiratory distress. .     Cardiovascular: Negative.    Gastrointestinal: Negative.    Musculoskeletal: Negative.    Skin: Negative  Neurological: Negative    Psychiatric/Behavioral: Negative.    Vitals Reviewed at facility  Physical Exam  Vitals and nursing note reviewed.   Constitutional:       General: He is not in acute distress.     Appearance: Normal appearance.   HENT:      Nose: Nasal cannula in place     Mouth: Mucous membranes are moist.      Pharynx: Oropharynx is clear. No oropharyngeal exudate.   Eyes:      Extraocular Movements: Extraocular movements intact.      Pupils:  Pupils are equal, round, and reactive to light.   Cardiovascular:      Rate and Rhythm: Normal rate and regular rhythm.      Pulses: Normal pulses.      Heart sounds: Normal heart sounds. No murmur heard.  Pulmonary:      Effort: Pulmonary effort is normal. Rhonchi over anterior lung fields.   Abdominal:      General: Abdomen is flat. Bowel sounds are normal. There is no distension.      Tenderness: There is no abdominal tenderness.   Musculoskeletal:         General: Normal range of motion.      Right lower leg: No significant edema     Left lower leg: No significant edema  Skin:     General: Skin is warm and dry.      Capillary Refill: Capillary refill takes less than 2 seconds.   Neurological:      General: No acute focal deficit present.      Mental Status: He is alert. Mental status is at baseline.      Cranial Nerves: No cranial nerve deficit.      Motor: NO weakness.   Psychiatric:         Mood and Affect: Mood normal.         Thought Content: Thought content normal.        Assessment/Plan   Diagnoses and all orders for this visit:  Paroxysmal atrial fibrillation (CMS/HCC)  Chronic heart failure, unspecified heart failure type (CMS/[facility])  Type 2 diabetes mellitus with stage 4 chronic kidney disease, with long-term current use of insulin (CMS/[facility])  Chronic kidney disease, stage 4 (severe) (CMS/HCC)  Obesity, morbid (CMS/HCC)  Insomnia, unspecified type  Benign essential hypertension        78-year-old male with past medical history significant for HFpEF, A-fib on Eliquis, CKD stage IV presenting to SNF status post right thoracentesis with adequate evacuation of pleural fluid pending cytology, current treatment for pneumonia with Augmentin and doxycycline, will continue PT OT and outpatient follow-up with palliative care with goal of titrating off supplemental O2 back to baseline.    #Acute hypoxic respiratory failure in the setting of pleural effusion  #Right-sided pleural effusion s/p thoracentesis  #ALETA on  CKD stage IV  #IDDM type II  #HFpEF (EF 50 to 60%)  #A-fib on AC  #Essential hypertension  #BPH    Plan: Continue to titrate supplemental O2 as tolerated to maintain SPO2 greater than 92%, will continue to hold torsemide and metolazone until ALETA improves noting most recent serum creatinine of 2.6 with a baseline of approximately normal.  Continue to monitor ins and outs.  We will continue with weekly labs to include BMP and CBC.  Continue PT OT.  Continue with plan to repeat CT chest in evaluation of lung parenchyma per pulmonology's recommendation.  We will continue Augmentin and doxycycline with an end of therapy of 8/21. Will continue all patient home medications as appriopriate. Patient was seen and evaluated at bedside this afternoon on arrival to the SNF patient medically stable currently we will continue to follow.    POST VISIT  Pts wife did want to know about prognosis and potential clinical course of patient. Discussed with staff that patient does have multiple medical issues and that patient likely will have a long recovery to which it is unknown if patient will ever be able to get off of oxygen. Discussed GOC with wife and patient, and they noted hat they were overall interested in hospice care. Referral placed, continue supportive care, routine labs, closely monitor, hospice likely meeting with patient shortly    >45 minutes spent in management of pt

## 2023-08-18 NOTE — LETTER
Patient: Eliecer Collins  : 1945    Encounter Date: 2023    Subjective  Patient ID: Eliecer Collins is a 78 y.o. male.    PT seen and examined. Overall stable but still requiring Oxygen. Kidney function continues to remain stable. Goals of care conversation was held yesterday and patient and wife are agreeable to hospice referral. Referral has been placed. Pt did have some questions about hospice care as well.        Review of Systems   Constitutional: Negative.    HENT: Negative.     Respiratory:  Positive for shortness of breath.    Cardiovascular: Negative.    Gastrointestinal: Negative.    Musculoskeletal: Negative.    Skin: Negative.    Neurological: Negative.    Psychiatric/Behavioral: Negative.         ObjectiveVitals Reviewed via facility EMR   Physical Exam  Vitals and nursing note reviewed.   Constitutional:       General: He is not in acute distress.     Appearance: Normal appearance.   HENT:      Mouth/Throat:      Mouth: Mucous membranes are moist.      Pharynx: Oropharynx is clear. No oropharyngeal exudate.   Eyes:      Extraocular Movements: Extraocular movements intact.      Pupils: Pupils are equal, round, and reactive to light.   Cardiovascular:      Rate and Rhythm: Normal rate and regular rhythm.      Pulses: Normal pulses.      Heart sounds: Normal heart sounds. No murmur heard.  Pulmonary:      Effort: Pulmonary effort is normal. No respiratory distress.   Abdominal:      General: Abdomen is flat. Bowel sounds are normal. There is no distension.      Tenderness: There is no abdominal tenderness.   Musculoskeletal:         General: Normal range of motion.      Right lower leg: Edema present.      Left lower leg: Edema present.      Comments: But improved and likely at baseline at +1-2   Skin:     General: Skin is warm and dry.      Capillary Refill: Capillary refill takes less than 2 seconds.   Neurological:      General: No focal deficit present.      Mental Status: He is alert.  Mental status is at baseline.      Cranial Nerves: No cranial nerve deficit.      Motor: No weakness.   Psychiatric:         Mood and Affect: Mood normal.         Thought Content: Thought content normal.         Assessment/Plan  Diagnoses and all orders for this visit:  SOB (shortness of breath) on exertion  Paroxysmal atrial fibrillation (CMS/HCC)  Chronic heart failure, unspecified heart failure type (CMS/HCC)  Benign essential hypertension  Stage 3 chronic kidney disease, unspecified whether stage 3a or 3b CKD (CMS/[facility])  Cardiorenal disease        Pt seen and examined, overall medically stable, discussed philosophy and goals of hospice care. Plan for meeting tomorrow. Supportive care, will hold off on labs at this time as patient likely transitioning to hospice care    >30 minutes spent in management of pt      Electronically Signed By: Rhys Millan DO   8/21/23  9:03 PM

## 2023-08-21 PROBLEM — J18.9 PNEUMONIA: Status: ACTIVE | Noted: 2023-01-01

## 2023-08-21 PROBLEM — I13.10 CARDIORENAL DISEASE: Status: ACTIVE | Noted: 2023-01-01

## 2023-08-21 PROBLEM — N18.30 CKD (CHRONIC KIDNEY DISEASE), STAGE III (MULTI): Status: ACTIVE | Noted: 2023-01-01

## 2023-08-21 PROBLEM — I50.43 ACUTE ON CHRONIC COMBINED SYSTOLIC AND DIASTOLIC CONGESTIVE HEART FAILURE (MULTI): Status: ACTIVE | Noted: 2023-01-01

## 2023-08-21 PROBLEM — E87.70 HYPERVOLEMIA: Status: ACTIVE | Noted: 2023-01-01

## 2023-08-21 PROBLEM — N18.9 ACUTE ON CHRONIC RENAL INSUFFICIENCY: Status: ACTIVE | Noted: 2023-01-01

## 2023-08-21 PROBLEM — G62.9 PERIPHERAL NERVE DISEASE: Status: ACTIVE | Noted: 2022-03-09

## 2023-08-21 PROBLEM — E87.29 RESPIRATORY ACIDOSIS: Status: ACTIVE | Noted: 2023-01-01

## 2023-08-21 PROBLEM — R06.02 SOB (SHORTNESS OF BREATH) ON EXERTION: Status: ACTIVE | Noted: 2023-01-01

## 2023-08-21 PROBLEM — J90 PLEURAL EFFUSION: Status: ACTIVE | Noted: 2023-01-01

## 2023-08-21 PROBLEM — E11.9 TYPE 2 DIABETES MELLITUS WITHOUT COMPLICATION, WITHOUT LONG-TERM CURRENT USE OF INSULIN (MULTI): Status: ACTIVE | Noted: 2022-03-09

## 2023-08-21 PROBLEM — N18.30 STAGE 3 CHRONIC KIDNEY DISEASE (MULTI): Status: ACTIVE | Noted: 2023-01-01

## 2023-08-21 PROBLEM — J81.0 FLASH PULMONARY EDEMA (MULTI): Status: ACTIVE | Noted: 2023-01-01

## 2023-08-21 PROBLEM — E87.5 HYPERKALEMIA: Status: ACTIVE | Noted: 2023-01-01

## 2023-08-21 PROBLEM — N28.9 ACUTE ON CHRONIC RENAL INSUFFICIENCY: Status: ACTIVE | Noted: 2023-01-01

## 2023-08-21 PROBLEM — E11.21 DIABETIC RENAL DISEASE (MULTI): Status: ACTIVE | Noted: 2023-01-01

## 2023-08-21 PROBLEM — G93.40 ENCEPHALOPATHY: Status: ACTIVE | Noted: 2023-01-01

## 2023-08-21 PROBLEM — Z95.0 PRESENCE OF CARDIAC PACEMAKER: Status: ACTIVE | Noted: 2023-01-01

## 2023-08-21 PROBLEM — L85.3 XEROSIS OF SKIN: Status: ACTIVE | Noted: 2022-03-09

## 2023-08-21 NOTE — LETTER
Patient: Eliecer Collins  : 1945    Encounter Date: 2023    Subjective  Patient ID: Eliecer Collins is a 78 y.o. male.    Pt seen and examined at bedside. He states that he is overall feeling well but is still weak. Is planning on meeting with hospice in the coming days. Minimally working with therapy. Feels as if he is having some congestion and some issues with breathing as well. No additional issues.        Review of Systems   Constitutional: Negative.    HENT: Negative.     Respiratory: Negative.     Cardiovascular: Negative.    Gastrointestinal: Negative.    Musculoskeletal: Negative.    Skin: Negative.    Neurological: Negative.    Psychiatric/Behavioral: Negative.         ObjectiveVitals Reviewed via facility EMR   Physical Exam  Vitals and nursing note reviewed.   Constitutional:       General: He is not in acute distress.     Appearance: Normal appearance.   HENT:      Mouth/Throat:      Mouth: Mucous membranes are moist.      Pharynx: Oropharynx is clear. No oropharyngeal exudate.   Eyes:      Extraocular Movements: Extraocular movements intact.      Pupils: Pupils are equal, round, and reactive to light.   Cardiovascular:      Rate and Rhythm: Normal rate and regular rhythm.      Pulses: Normal pulses.      Heart sounds: Normal heart sounds. No murmur heard.  Pulmonary:      Effort: Respiratory distress present.      Breath sounds: Wheezing present.      Comments: RLL wheezing and consolidation  Abdominal:      General: Abdomen is flat. Bowel sounds are normal. There is no distension.      Tenderness: There is no abdominal tenderness.   Musculoskeletal:         General: Normal range of motion.      Right lower leg: Edema present.      Left lower leg: Edema present.   Skin:     General: Skin is warm and dry.      Capillary Refill: Capillary refill takes less than 2 seconds.   Neurological:      General: No focal deficit present.      Mental Status: He is alert. Mental status is at baseline.       Cranial Nerves: No cranial nerve deficit.      Motor: No weakness.   Psychiatric:         Mood and Affect: Mood normal.         Thought Content: Thought content normal.         Assessment/Plan  Diagnoses and all orders for this visit:  Paroxysmal atrial fibrillation (CMS/[facility])  Presence of cardiac pacemaker  Chronic heart failure, unspecified heart failure type (CMS/[facility])  Acute on chronic combined systolic and diastolic congestive heart failure (CMS/[facility])  Stage 3 chronic kidney disease, unspecified whether stage 3a or 3b CKD (CMS/[facility])  Type 2 diabetes mellitus without complication, without long-term current use of insulin (CMS/[facility])  Insomnia, unspecified type      Noted issues with right lower lobe on exam. Will obtain chest x-ray to eval for pneumonia and pleural effusion. Post visit patient noted to have worsening consolidation, start on augmentin, closely monitor, with regards to hospice, discussed with patient that if his condition continues to progess he would require dialysis, he stated he did not want this. Appreciate hospice recommendations with regards to this, closely monitor, routine labs    >30 minutes spent in management of pt      Electronically Signed By: Rhys Millan DO   8/24/23  8:58 PM

## 2023-08-22 NOTE — PROGRESS NOTES
Subjective   Patient ID: Eliecer Collins is a 78 y.o. male.    PT seen and examined. Overall stable but still requiring Oxygen. Kidney function continues to remain stable. Goals of care conversation was held yesterday and patient and wife are agreeable to hospice referral. Referral has been placed. Pt did have some questions about hospice care as well.        Review of Systems   Constitutional: Negative.    HENT: Negative.     Respiratory:  Positive for shortness of breath.    Cardiovascular: Negative.    Gastrointestinal: Negative.    Musculoskeletal: Negative.    Skin: Negative.    Neurological: Negative.    Psychiatric/Behavioral: Negative.         Objective Vitals Reviewed via facility EMR   Physical Exam  Vitals and nursing note reviewed.   Constitutional:       General: He is not in acute distress.     Appearance: Normal appearance.   HENT:      Mouth/Throat:      Mouth: Mucous membranes are moist.      Pharynx: Oropharynx is clear. No oropharyngeal exudate.   Eyes:      Extraocular Movements: Extraocular movements intact.      Pupils: Pupils are equal, round, and reactive to light.   Cardiovascular:      Rate and Rhythm: Normal rate and regular rhythm.      Pulses: Normal pulses.      Heart sounds: Normal heart sounds. No murmur heard.  Pulmonary:      Effort: Pulmonary effort is normal. No respiratory distress.   Abdominal:      General: Abdomen is flat. Bowel sounds are normal. There is no distension.      Tenderness: There is no abdominal tenderness.   Musculoskeletal:         General: Normal range of motion.      Right lower leg: Edema present.      Left lower leg: Edema present.      Comments: But improved and likely at baseline at +1-2   Skin:     General: Skin is warm and dry.      Capillary Refill: Capillary refill takes less than 2 seconds.   Neurological:      General: No focal deficit present.      Mental Status: He is alert. Mental status is at baseline.      Cranial Nerves: No cranial nerve  deficit.      Motor: No weakness.   Psychiatric:         Mood and Affect: Mood normal.         Thought Content: Thought content normal.         Assessment/Plan   Diagnoses and all orders for this visit:  SOB (shortness of breath) on exertion  Paroxysmal atrial fibrillation (CMS/HCC)  Chronic heart failure, unspecified heart failure type (CMS/HCC)  Benign essential hypertension  Stage 3 chronic kidney disease, unspecified whether stage 3a or 3b CKD (CMS/[facility])  Cardiorenal disease        Pt seen and examined, overall medically stable, discussed philosophy and goals of hospice care. Plan for meeting tomorrow. Supportive care, will hold off on labs at this time as patient likely transitioning to hospice care    >30 minutes spent in management of pt

## 2023-08-25 NOTE — LETTER
Patient: Eliecer Collins  : 1945    Encounter Date: 2023    Subjective  Patient ID: Eliecer Collins is a 78 y.o. male.    PT seen and examined. Overall medically stable but is minimally improving with therapy. States his symptoms are overall well controlled. Tolerating his medications, does not feel fluid overloaded. Plan to be discharged with hospice when appropriate. No additional issues.        Review of Systems   Constitutional: Negative.    HENT: Negative.     Respiratory: Negative.          Chronic hypoxia   Cardiovascular: Negative.    Gastrointestinal: Negative.    Musculoskeletal: Negative.    Skin: Negative.    Neurological: Negative.    Psychiatric/Behavioral: Negative.         ObjectiveVitals Reviewed via facility EMR   Physical Exam  Vitals and nursing note reviewed.   Constitutional:       General: He is not in acute distress.     Appearance: Normal appearance.   HENT:      Mouth/Throat:      Mouth: Mucous membranes are moist.      Pharynx: Oropharynx is clear. No oropharyngeal exudate.   Eyes:      Extraocular Movements: Extraocular movements intact.      Pupils: Pupils are equal, round, and reactive to light.   Cardiovascular:      Rate and Rhythm: Normal rate and regular rhythm.      Pulses: Normal pulses.      Heart sounds: Normal heart sounds. No murmur heard.     Comments: euvolemic  Pulmonary:      Effort: Pulmonary effort is normal. No respiratory distress.      Comments: On o2  Abdominal:      General: Abdomen is flat. Bowel sounds are normal. There is no distension.      Tenderness: There is no abdominal tenderness.   Musculoskeletal:         General: Normal range of motion.      Right lower leg: No edema.      Left lower leg: No edema.   Skin:     General: Skin is warm and dry.      Capillary Refill: Capillary refill takes less than 2 seconds.   Neurological:      General: No focal deficit present.      Mental Status: He is alert. Mental status is at baseline.      Cranial  Nerves: No cranial nerve deficit.      Motor: No weakness.   Psychiatric:         Mood and Affect: Mood normal.         Thought Content: Thought content normal.         Assessment/Plan  Diagnoses and all orders for this visit:  SOB (shortness of breath) on exertion  Paroxysmal atrial fibrillation (CMS/HCC)  Chronic heart failure, unspecified heart failure type (CMS/HCC)  Acute on chronic combined systolic and diastolic congestive heart failure (CMS/HCC)  Type 2 diabetes mellitus with stage 4 chronic kidney disease, with long-term current use of insulin (CMS/HCC)  Obesity, morbid (CMS/HCC)  Hypervolemia, unspecified hypervolemia type      Pt seen and examined, overall medically stable continue to work with Pt/OT, supportive care, routine labs      Electronically Signed By: Rhys Millan DO   8/27/23  8:35 PM

## 2023-08-25 NOTE — PROGRESS NOTES
Subjective   Patient ID: Eliecer Collins is a 78 y.o. male.    Pt seen and examined at bedside. He states that he is overall feeling well but is still weak. Is planning on meeting with hospice in the coming days. Minimally working with therapy. Feels as if he is having some congestion and some issues with breathing as well. No additional issues.        Review of Systems   Constitutional: Negative.    HENT: Negative.     Respiratory: Negative.     Cardiovascular: Negative.    Gastrointestinal: Negative.    Musculoskeletal: Negative.    Skin: Negative.    Neurological: Negative.    Psychiatric/Behavioral: Negative.         Objective Vitals Reviewed via facility EMR   Physical Exam  Vitals and nursing note reviewed.   Constitutional:       General: He is not in acute distress.     Appearance: Normal appearance.   HENT:      Mouth/Throat:      Mouth: Mucous membranes are moist.      Pharynx: Oropharynx is clear. No oropharyngeal exudate.   Eyes:      Extraocular Movements: Extraocular movements intact.      Pupils: Pupils are equal, round, and reactive to light.   Cardiovascular:      Rate and Rhythm: Normal rate and regular rhythm.      Pulses: Normal pulses.      Heart sounds: Normal heart sounds. No murmur heard.  Pulmonary:      Effort: Respiratory distress present.      Breath sounds: Wheezing present.      Comments: RLL wheezing and consolidation  Abdominal:      General: Abdomen is flat. Bowel sounds are normal. There is no distension.      Tenderness: There is no abdominal tenderness.   Musculoskeletal:         General: Normal range of motion.      Right lower leg: Edema present.      Left lower leg: Edema present.   Skin:     General: Skin is warm and dry.      Capillary Refill: Capillary refill takes less than 2 seconds.   Neurological:      General: No focal deficit present.      Mental Status: He is alert. Mental status is at baseline.      Cranial Nerves: No cranial nerve deficit.      Motor: No weakness.    Psychiatric:         Mood and Affect: Mood normal.         Thought Content: Thought content normal.         Assessment/Plan   Diagnoses and all orders for this visit:  Paroxysmal atrial fibrillation (CMS/[facility])  Presence of cardiac pacemaker  Chronic heart failure, unspecified heart failure type (CMS/[facility])  Acute on chronic combined systolic and diastolic congestive heart failure (CMS/[facility])  Stage 3 chronic kidney disease, unspecified whether stage 3a or 3b CKD (CMS/[facility])  Type 2 diabetes mellitus without complication, without long-term current use of insulin (CMS/[facility])  Insomnia, unspecified type      Noted issues with right lower lobe on exam. Will obtain chest x-ray to eval for pneumonia and pleural effusion. Post visit patient noted to have worsening consolidation, start on augmentin, closely monitor, with regards to hospice, discussed with patient that if his condition continues to progess he would require dialysis, he stated he did not want this. Appreciate hospice recommendations with regards to this, closely monitor, routine labs    >30 minutes spent in management of pt

## 2023-08-28 NOTE — PROGRESS NOTES
Subjective   Patient ID: Eliecer Collins is a 78 y.o. male.    PT seen and examined. Overall medically stable but is minimally improving with therapy. States his symptoms are overall well controlled. Tolerating his medications, does not feel fluid overloaded. Plan to be discharged with hospice when appropriate. No additional issues.        Review of Systems   Constitutional: Negative.    HENT: Negative.     Respiratory: Negative.          Chronic hypoxia   Cardiovascular: Negative.    Gastrointestinal: Negative.    Musculoskeletal: Negative.    Skin: Negative.    Neurological: Negative.    Psychiatric/Behavioral: Negative.         Objective Vitals Reviewed via facility EMR   Physical Exam  Vitals and nursing note reviewed.   Constitutional:       General: He is not in acute distress.     Appearance: Normal appearance.   HENT:      Mouth/Throat:      Mouth: Mucous membranes are moist.      Pharynx: Oropharynx is clear. No oropharyngeal exudate.   Eyes:      Extraocular Movements: Extraocular movements intact.      Pupils: Pupils are equal, round, and reactive to light.   Cardiovascular:      Rate and Rhythm: Normal rate and regular rhythm.      Pulses: Normal pulses.      Heart sounds: Normal heart sounds. No murmur heard.     Comments: euvolemic  Pulmonary:      Effort: Pulmonary effort is normal. No respiratory distress.      Comments: On o2  Abdominal:      General: Abdomen is flat. Bowel sounds are normal. There is no distension.      Tenderness: There is no abdominal tenderness.   Musculoskeletal:         General: Normal range of motion.      Right lower leg: No edema.      Left lower leg: No edema.   Skin:     General: Skin is warm and dry.      Capillary Refill: Capillary refill takes less than 2 seconds.   Neurological:      General: No focal deficit present.      Mental Status: He is alert. Mental status is at baseline.      Cranial Nerves: No cranial nerve deficit.      Motor: No weakness.   Psychiatric:          Mood and Affect: Mood normal.         Thought Content: Thought content normal.         Assessment/Plan   Diagnoses and all orders for this visit:  SOB (shortness of breath) on exertion  Paroxysmal atrial fibrillation (CMS/HCC)  Chronic heart failure, unspecified heart failure type (CMS/HCC)  Acute on chronic combined systolic and diastolic congestive heart failure (CMS/HCC)  Type 2 diabetes mellitus with stage 4 chronic kidney disease, with long-term current use of insulin (CMS/HCC)  Obesity, morbid (CMS/HCC)  Hypervolemia, unspecified hypervolemia type      Pt seen and examined, overall medically stable continue to work with Pt/OT, supportive care, routine labs

## 2023-08-30 NOTE — LETTER
Patient: Eliecer Collins  : 1945    Encounter Date: 2023    Subjective  Patient ID: Eliecer Collins is a 78 y.o. male.    Patient seen resting comfortably in bed.  States overall feeling well has made minimal progress with therapy.  Hospice has been consulted for patient, however patient would like to proceed with hospice care.  Discussed with hospice agency and will plan to discharge patient on Friday under hospice care.        Review of Systems   Constitutional: Negative.    HENT: Negative.     Respiratory: Negative.     Cardiovascular: Negative.    Gastrointestinal: Negative.    Musculoskeletal: Negative.    Skin: Negative.    Neurological: Negative.    Psychiatric/Behavioral: Negative.         ObjectiveVitals Reviewed via facility EMR   Physical Exam  Vitals and nursing note reviewed.   Constitutional:       General: He is not in acute distress.     Appearance: Normal appearance.   HENT:      Mouth/Throat:      Mouth: Mucous membranes are moist.      Pharynx: Oropharynx is clear. No oropharyngeal exudate.   Eyes:      Extraocular Movements: Extraocular movements intact.      Pupils: Pupils are equal, round, and reactive to light.   Cardiovascular:      Rate and Rhythm: Normal rate and regular rhythm.      Pulses: Normal pulses.      Heart sounds: Normal heart sounds. No murmur heard.  Pulmonary:      Effort: Pulmonary effort is normal. No respiratory distress.      Comments: On chronic o2  Abdominal:      General: Abdomen is flat. Bowel sounds are normal. There is no distension.      Tenderness: There is no abdominal tenderness.   Musculoskeletal:         General: Normal range of motion.      Right lower leg: No edema.      Left lower leg: No edema.   Skin:     General: Skin is warm and dry.      Capillary Refill: Capillary refill takes less than 2 seconds.   Neurological:      General: No focal deficit present.      Mental Status: He is alert. Mental status is at baseline.      Cranial Nerves: No  cranial nerve deficit.      Motor: No weakness.   Psychiatric:         Mood and Affect: Mood normal.         Thought Content: Thought content normal.         Assessment/Plan  Diagnoses and all orders for this visit:  Paroxysmal atrial fibrillation (CMS/[facility])  Chronic heart failure, unspecified heart failure type (CMS/[facility])  Acute on chronic combined systolic and diastolic congestive heart failure (CMS/[facility])  Type 2 diabetes mellitus with stage 4 chronic kidney disease, with long-term current use of insulin (CMS/[facility])  Stage 3 chronic kidney disease, unspecified whether stage 3a or 3b CKD (CMS/[facility])  Chronic kidney disease, stage 4 (severe) (CMS/[facility])  Insomnia, unspecified type      Patient seen and examined.  Overall medically stable.  Continue supportive care.  Appreciate hospice recommendations and management. Labs overall stable and improved, patient likely medically optimized      Electronically Signed By: Rhys Millan DO   8/30/23  9:03 PM

## 2023-08-31 NOTE — PROGRESS NOTES
Subjective   Patient ID: Eliecer Collins is a 78 y.o. male.    Patient seen resting comfortably in bed.  States overall feeling well has made minimal progress with therapy.  Hospice has been consulted for patient, however patient would like to proceed with hospice care.  Discussed with hospice agency and will plan to discharge patient on Friday under hospice care.        Review of Systems   Constitutional: Negative.    HENT: Negative.     Respiratory: Negative.     Cardiovascular: Negative.    Gastrointestinal: Negative.    Musculoskeletal: Negative.    Skin: Negative.    Neurological: Negative.    Psychiatric/Behavioral: Negative.         Objective Vitals Reviewed via facility EMR   Physical Exam  Vitals and nursing note reviewed.   Constitutional:       General: He is not in acute distress.     Appearance: Normal appearance.   HENT:      Mouth/Throat:      Mouth: Mucous membranes are moist.      Pharynx: Oropharynx is clear. No oropharyngeal exudate.   Eyes:      Extraocular Movements: Extraocular movements intact.      Pupils: Pupils are equal, round, and reactive to light.   Cardiovascular:      Rate and Rhythm: Normal rate and regular rhythm.      Pulses: Normal pulses.      Heart sounds: Normal heart sounds. No murmur heard.  Pulmonary:      Effort: Pulmonary effort is normal. No respiratory distress.      Comments: On chronic o2  Abdominal:      General: Abdomen is flat. Bowel sounds are normal. There is no distension.      Tenderness: There is no abdominal tenderness.   Musculoskeletal:         General: Normal range of motion.      Right lower leg: No edema.      Left lower leg: No edema.   Skin:     General: Skin is warm and dry.      Capillary Refill: Capillary refill takes less than 2 seconds.   Neurological:      General: No focal deficit present.      Mental Status: He is alert. Mental status is at baseline.      Cranial Nerves: No cranial nerve deficit.      Motor: No weakness.   Psychiatric:          Mood and Affect: Mood normal.         Thought Content: Thought content normal.         Assessment/Plan   Diagnoses and all orders for this visit:  Paroxysmal atrial fibrillation (CMS/[facility])  Chronic heart failure, unspecified heart failure type (CMS/[facility])  Acute on chronic combined systolic and diastolic congestive heart failure (CMS/[facility])  Type 2 diabetes mellitus with stage 4 chronic kidney disease, with long-term current use of insulin (CMS/[facility])  Stage 3 chronic kidney disease, unspecified whether stage 3a or 3b CKD (CMS/[facility])  Chronic kidney disease, stage 4 (severe) (CMS/[facility])  Insomnia, unspecified type      Patient seen and examined.  Overall medically stable.  Continue supportive care.  Appreciate hospice recommendations and management. Labs overall stable and improved, patient likely medically optimized

## 2023-09-11 NOTE — LETTER
Patient: Eliecer Collins  : 1945    Encounter Date: 2023    Subjective  Patient ID: Eliecer Collins is a 78 y.o. male.    Pt is a 78 year old male with CKD, HFpEF, chronic hypoxic respiratory failure Who presented from home due to respite care. Patient recently discharged home with hospice however at home patients family was not able to care for all needs of patient. They currently do not have aids and likely needs them. Patient states that overall he is doing well, symptoms are overall stable. Regards no additional issues or concerns.        Review of Systems   Constitutional: Negative.    HENT: Negative.     Respiratory: Negative.     Cardiovascular: Negative.    Gastrointestinal: Negative.    Musculoskeletal: Negative.    Skin: Negative.    Neurological: Negative.    Psychiatric/Behavioral: Negative.         ObjectiveVitals Reviewed via facility EMR   Physical Exam  Vitals and nursing note reviewed.   Constitutional:       General: He is not in acute distress.     Appearance: Normal appearance.      Comments: In bed, overall stable   HENT:      Mouth/Throat:      Mouth: Mucous membranes are moist.      Pharynx: Oropharynx is clear. No oropharyngeal exudate.   Eyes:      Extraocular Movements: Extraocular movements intact.      Pupils: Pupils are equal, round, and reactive to light.   Cardiovascular:      Rate and Rhythm: Normal rate and regular rhythm.      Pulses: Normal pulses.      Heart sounds: Normal heart sounds. No murmur heard.  Pulmonary:      Effort: Pulmonary effort is normal. No respiratory distress.   Abdominal:      General: Abdomen is flat. Bowel sounds are normal. There is no distension.      Tenderness: There is no abdominal tenderness.   Musculoskeletal:         General: Normal range of motion.      Right lower leg: No edema.      Left lower leg: No edema.   Skin:     General: Skin is warm and dry.      Capillary Refill: Capillary refill takes less than 2 seconds.   Neurological:       General: No focal deficit present.      Mental Status: He is alert. Mental status is at baseline.      Cranial Nerves: No cranial nerve deficit.      Motor: No weakness.   Psychiatric:         Mood and Affect: Mood normal.         Thought Content: Thought content normal.         Assessment/Plan  Diagnoses and all orders for this visit:  Paroxysmal atrial fibrillation (CMS/[facility])  Chronic heart failure, unspecified heart failure type (CMS/[facility])  Cardiorenal disease  Type 2 diabetes mellitus with stage 4 chronic kidney disease, with long-term current use of insulin (CMS/[facility])  Obesity, morbid (CMS/[facility])  Stage 3 chronic kidney disease, unspecified whether stage 3a or 3b CKD (CMS/[facility])  Chronic kidney disease, stage 4 (severe) (CMS/[facility])      Pt seen and examined, medications reconciled. Continue supportive care, appreciate hospice recommendations.      Electronically Signed By: Rhys Millan DO   9/14/23  7:55 PM

## 2023-09-13 NOTE — LETTER
Patient: Eliecer Collins  : 1945    Encounter Date: 2023    Subjective  Patient ID: Eliecer Collins is a 78 y.o. male.    Pt seen and examined, states no issues or concerns. Is hopeful for discharge shortly. Regards that he is overall feeling well without any issues.        Review of Systems   Constitutional: Negative.    HENT: Negative.     Respiratory: Negative.     Cardiovascular: Negative.    Gastrointestinal: Negative.    Musculoskeletal: Negative.    Skin: Negative.    Neurological: Negative.    Psychiatric/Behavioral: Negative.         ObjectiveVitals Reviewed via facility EMR   Physical Exam  Vitals and nursing note reviewed.   Constitutional:       General: He is not in acute distress.     Appearance: Normal appearance.   HENT:      Mouth/Throat:      Mouth: Mucous membranes are moist.      Pharynx: Oropharynx is clear. No oropharyngeal exudate.   Eyes:      Extraocular Movements: Extraocular movements intact.      Pupils: Pupils are equal, round, and reactive to light.   Cardiovascular:      Rate and Rhythm: Normal rate and regular rhythm.      Pulses: Normal pulses.      Heart sounds: Normal heart sounds. No murmur heard.  Pulmonary:      Effort: Pulmonary effort is normal. No respiratory distress.   Abdominal:      General: Abdomen is flat. Bowel sounds are normal. There is no distension.      Tenderness: There is no abdominal tenderness.   Musculoskeletal:         General: Normal range of motion.      Right lower leg: No edema.      Left lower leg: No edema.   Skin:     General: Skin is warm and dry.      Capillary Refill: Capillary refill takes less than 2 seconds.   Neurological:      General: No focal deficit present.      Mental Status: He is alert. Mental status is at baseline.      Cranial Nerves: No cranial nerve deficit.      Motor: No weakness.   Psychiatric:         Mood and Affect: Mood normal.         Thought Content: Thought content normal.         Assessment/Plan  Diagnoses  and all orders for this visit:  Paroxysmal atrial fibrillation (CMS/[facility])  Chronic heart failure, unspecified heart failure type (CMS/[facility])  Type 2 diabetes mellitus with stage 4 chronic kidney disease, with long-term current use of insulin (CMS/[facility])  Chronic kidney disease, stage 4 (severe) (CMS/[facility])      Pt seen and examined, continue supportive care, no labs due to hospice status. Plan for discharge shortly, is overall medically stable.      Electronically Signed By: Rhys Millan DO   9/16/23  1:18 PM

## 2023-09-14 NOTE — PROGRESS NOTES
Subjective   Patient ID: Eliecer Collins is a 78 y.o. male.    Pt is a 78 year old male with CKD, HFpEF, chronic hypoxic respiratory failure Who presented from home due to respite care. Patient recently discharged home with hospice however at home patients family was not able to care for all needs of patient. They currently do not have aids and likely needs them. Patient states that overall he is doing well, symptoms are overall stable. Regards no additional issues or concerns.        Review of Systems   Constitutional: Negative.    HENT: Negative.     Respiratory: Negative.     Cardiovascular: Negative.    Gastrointestinal: Negative.    Musculoskeletal: Negative.    Skin: Negative.    Neurological: Negative.    Psychiatric/Behavioral: Negative.         Objective Vitals Reviewed via facility EMR   Physical Exam  Vitals and nursing note reviewed.   Constitutional:       General: He is not in acute distress.     Appearance: Normal appearance.      Comments: In bed, overall stable   HENT:      Mouth/Throat:      Mouth: Mucous membranes are moist.      Pharynx: Oropharynx is clear. No oropharyngeal exudate.   Eyes:      Extraocular Movements: Extraocular movements intact.      Pupils: Pupils are equal, round, and reactive to light.   Cardiovascular:      Rate and Rhythm: Normal rate and regular rhythm.      Pulses: Normal pulses.      Heart sounds: Normal heart sounds. No murmur heard.  Pulmonary:      Effort: Pulmonary effort is normal. No respiratory distress.   Abdominal:      General: Abdomen is flat. Bowel sounds are normal. There is no distension.      Tenderness: There is no abdominal tenderness.   Musculoskeletal:         General: Normal range of motion.      Right lower leg: No edema.      Left lower leg: No edema.   Skin:     General: Skin is warm and dry.      Capillary Refill: Capillary refill takes less than 2 seconds.   Neurological:      General: No focal deficit present.      Mental Status: He is alert.  Mental status is at baseline.      Cranial Nerves: No cranial nerve deficit.      Motor: No weakness.   Psychiatric:         Mood and Affect: Mood normal.         Thought Content: Thought content normal.         Assessment/Plan   Diagnoses and all orders for this visit:  Paroxysmal atrial fibrillation (CMS/[facility])  Chronic heart failure, unspecified heart failure type (CMS/[facility])  Cardiorenal disease  Type 2 diabetes mellitus with stage 4 chronic kidney disease, with long-term current use of insulin (CMS/[facility])  Obesity, morbid (CMS/[facility])  Stage 3 chronic kidney disease, unspecified whether stage 3a or 3b CKD (CMS/[facility])  Chronic kidney disease, stage 4 (severe) (CMS/[facility])      Pt seen and examined, medications reconciled. Continue supportive care, appreciate hospice recommendations.

## 2023-09-16 NOTE — PROGRESS NOTES
Subjective   Patient ID: Eliecer Collins is a 78 y.o. male.    Pt seen and examined, states no issues or concerns. Is hopeful for discharge shortly. Regards that he is overall feeling well without any issues.        Review of Systems   Constitutional: Negative.    HENT: Negative.     Respiratory: Negative.     Cardiovascular: Negative.    Gastrointestinal: Negative.    Musculoskeletal: Negative.    Skin: Negative.    Neurological: Negative.    Psychiatric/Behavioral: Negative.         Objective Vitals Reviewed via facility EMR   Physical Exam  Vitals and nursing note reviewed.   Constitutional:       General: He is not in acute distress.     Appearance: Normal appearance.   HENT:      Mouth/Throat:      Mouth: Mucous membranes are moist.      Pharynx: Oropharynx is clear. No oropharyngeal exudate.   Eyes:      Extraocular Movements: Extraocular movements intact.      Pupils: Pupils are equal, round, and reactive to light.   Cardiovascular:      Rate and Rhythm: Normal rate and regular rhythm.      Pulses: Normal pulses.      Heart sounds: Normal heart sounds. No murmur heard.  Pulmonary:      Effort: Pulmonary effort is normal. No respiratory distress.   Abdominal:      General: Abdomen is flat. Bowel sounds are normal. There is no distension.      Tenderness: There is no abdominal tenderness.   Musculoskeletal:         General: Normal range of motion.      Right lower leg: No edema.      Left lower leg: No edema.   Skin:     General: Skin is warm and dry.      Capillary Refill: Capillary refill takes less than 2 seconds.   Neurological:      General: No focal deficit present.      Mental Status: He is alert. Mental status is at baseline.      Cranial Nerves: No cranial nerve deficit.      Motor: No weakness.   Psychiatric:         Mood and Affect: Mood normal.         Thought Content: Thought content normal.         Assessment/Plan   Diagnoses and all orders for this visit:  Paroxysmal atrial fibrillation  (CMS/[facility])  Chronic heart failure, unspecified heart failure type (CMS/[facility])  Type 2 diabetes mellitus with stage 4 chronic kidney disease, with long-term current use of insulin (CMS/[facility])  Chronic kidney disease, stage 4 (severe) (CMS/[facility])      Pt seen and examined, continue supportive care, no labs due to hospice status. Plan for discharge shortly, is overall medically stable.

## 2023-10-25 ENCOUNTER — APPOINTMENT (OUTPATIENT)
Dept: CARDIOLOGY | Facility: CLINIC | Age: 78
End: 2023-10-25
Payer: MEDICARE